# Patient Record
Sex: FEMALE | Race: WHITE | Employment: OTHER | URBAN - NONMETROPOLITAN AREA
[De-identification: names, ages, dates, MRNs, and addresses within clinical notes are randomized per-mention and may not be internally consistent; named-entity substitution may affect disease eponyms.]

---

## 2020-10-05 ENCOUNTER — APPOINTMENT (OUTPATIENT)
Dept: CT IMAGING | Age: 75
DRG: 871 | End: 2020-10-05
Payer: MEDICARE

## 2020-10-05 ENCOUNTER — HOSPITAL ENCOUNTER (INPATIENT)
Age: 75
LOS: 9 days | Discharge: NURSING, REHAB OR LONG-TERM CARE | DRG: 871 | End: 2020-10-14
Attending: EMERGENCY MEDICINE | Admitting: STUDENT IN AN ORGANIZED HEALTH CARE EDUCATION/TRAINING PROGRAM
Payer: MEDICARE

## 2020-10-05 ENCOUNTER — APPOINTMENT (OUTPATIENT)
Dept: GENERAL RADIOLOGY | Age: 75
DRG: 871 | End: 2020-10-05
Payer: MEDICARE

## 2020-10-05 PROBLEM — A41.9 SEPSIS WITH METABOLIC ENCEPHALOPATHY (HCC): Status: ACTIVE | Noted: 2020-10-05

## 2020-10-05 PROBLEM — E11.9 DIABETES MELLITUS (HCC): Status: ACTIVE | Noted: 2020-10-05

## 2020-10-05 PROBLEM — N39.0 UTI (URINARY TRACT INFECTION): Status: ACTIVE | Noted: 2020-10-05

## 2020-10-05 PROBLEM — G93.41 SEPSIS WITH METABOLIC ENCEPHALOPATHY (HCC): Status: ACTIVE | Noted: 2020-10-05

## 2020-10-05 PROBLEM — E83.52 HYPERCALCEMIA: Status: ACTIVE | Noted: 2020-10-05

## 2020-10-05 PROBLEM — R65.20 SEPSIS WITH METABOLIC ENCEPHALOPATHY (HCC): Status: ACTIVE | Noted: 2020-10-05

## 2020-10-05 PROBLEM — R41.82 AMS (ALTERED MENTAL STATUS): Status: ACTIVE | Noted: 2020-10-05

## 2020-10-05 LAB
ALBUMIN SERPL-MCNC: 4.4 G/DL (ref 3.5–5.2)
ALP BLD-CCNC: 59 U/L (ref 35–104)
ALT SERPL-CCNC: 23 U/L (ref 5–33)
AMMONIA: 12 UMOL/L (ref 11–51)
AMPHETAMINE SCREEN, URINE: NEGATIVE
ANION GAP SERPL CALCULATED.3IONS-SCNC: 11 MMOL/L (ref 7–19)
AST SERPL-CCNC: 22 U/L (ref 5–32)
BACTERIA: ABNORMAL /HPF
BARBITURATE SCREEN URINE: NEGATIVE
BASOPHILS ABSOLUTE: 0 K/UL (ref 0–0.2)
BASOPHILS RELATIVE PERCENT: 0.5 % (ref 0–1)
BENZODIAZEPINE SCREEN, URINE: NEGATIVE
BILIRUB SERPL-MCNC: 0.6 MG/DL (ref 0.2–1.2)
BILIRUBIN URINE: NEGATIVE
BLOOD, URINE: ABNORMAL
BUN BLDV-MCNC: 19 MG/DL (ref 8–23)
CALCIUM IONIZED: 1.49 MMOL/L (ref 1.12–1.32)
CALCIUM SERPL-MCNC: 12.1 MG/DL (ref 8.8–10.2)
CANNABINOID SCREEN URINE: NEGATIVE
CHLORIDE BLD-SCNC: 102 MMOL/L (ref 98–111)
CLARITY: ABNORMAL
CO2: 23 MMOL/L (ref 22–29)
COCAINE METABOLITE SCREEN URINE: NEGATIVE
COLOR: YELLOW
CREAT SERPL-MCNC: 0.9 MG/DL (ref 0.5–0.9)
CRYSTALS, UA: ABNORMAL /HPF
EOSINOPHILS ABSOLUTE: 0 K/UL (ref 0–0.6)
EOSINOPHILS RELATIVE PERCENT: 0.3 % (ref 0–5)
EPITHELIAL CELLS, UA: ABNORMAL /HPF
GFR AFRICAN AMERICAN: >59
GFR NON-AFRICAN AMERICAN: >60
GLUCOSE BLD-MCNC: 116 MG/DL (ref 70–99)
GLUCOSE BLD-MCNC: 146 MG/DL
GLUCOSE BLD-MCNC: 146 MG/DL (ref 70–99)
GLUCOSE BLD-MCNC: 169 MG/DL (ref 70–99)
GLUCOSE BLD-MCNC: 196 MG/DL (ref 70–99)
GLUCOSE BLD-MCNC: 211 MG/DL (ref 74–109)
GLUCOSE BLD-MCNC: 315 MG/DL (ref 70–99)
GLUCOSE URINE: =>1000 MG/DL
HCT VFR BLD CALC: 42.9 % (ref 37–47)
HEMOGLOBIN: 13.5 G/DL (ref 12–16)
IMMATURE GRANULOCYTES #: 0 K/UL
KETONES, URINE: 15 MG/DL
LEUKOCYTE ESTERASE, URINE: ABNORMAL
LYMPHOCYTES ABSOLUTE: 1.2 K/UL (ref 1.1–4.5)
LYMPHOCYTES RELATIVE PERCENT: 14.2 % (ref 20–40)
Lab: NORMAL
MAGNESIUM: 1.9 MG/DL (ref 1.6–2.4)
MCH RBC QN AUTO: 29.7 PG (ref 27–31)
MCHC RBC AUTO-ENTMCNC: 31.5 G/DL (ref 33–37)
MCV RBC AUTO: 94.5 FL (ref 81–99)
MONOCYTES ABSOLUTE: 0.5 K/UL (ref 0–0.9)
MONOCYTES RELATIVE PERCENT: 5.4 % (ref 0–10)
NEUTROPHILS ABSOLUTE: 6.8 K/UL (ref 1.5–7.5)
NEUTROPHILS RELATIVE PERCENT: 79.3 % (ref 50–65)
NITRITE, URINE: NEGATIVE
OPIATE SCREEN URINE: NEGATIVE
PARATHYROID HORMONE INTACT: 112.6 PG/ML (ref 15–65)
PDW BLD-RTO: 16.6 % (ref 11.5–14.5)
PERFORMED ON: ABNORMAL
PH UA: 5 (ref 5–8)
PHOSPHORUS: 2.6 MG/DL (ref 2.5–4.5)
PLATELET # BLD: 304 K/UL (ref 130–400)
PMV BLD AUTO: 10.1 FL (ref 9.4–12.3)
POTASSIUM REFLEX MAGNESIUM: 4.5 MMOL/L (ref 3.5–5)
PROTEIN UA: 30 MG/DL
RBC # BLD: 4.54 M/UL (ref 4.2–5.4)
RBC UA: ABNORMAL /HPF (ref 0–2)
SODIUM BLD-SCNC: 136 MMOL/L (ref 136–145)
SPECIFIC GRAVITY UA: 1.02 (ref 1–1.03)
TOTAL PROTEIN: 7.4 G/DL (ref 6.6–8.7)
TSH REFLEX FT4: 1.58 UIU/ML (ref 0.35–5.5)
UROBILINOGEN, URINE: 1 E.U./DL
VITAMIN D 25-HYDROXY: 37.7 NG/ML
WBC # BLD: 8.6 K/UL (ref 4.8–10.8)
WBC UA: ABNORMAL /HPF (ref 0–5)

## 2020-10-05 PROCEDURE — 6370000000 HC RX 637 (ALT 250 FOR IP): Performed by: STUDENT IN AN ORGANIZED HEALTH CARE EDUCATION/TRAINING PROGRAM

## 2020-10-05 PROCEDURE — 6370000000 HC RX 637 (ALT 250 FOR IP): Performed by: FAMILY MEDICINE

## 2020-10-05 PROCEDURE — 92610 EVALUATE SWALLOWING FUNCTION: CPT

## 2020-10-05 PROCEDURE — 87086 URINE CULTURE/COLONY COUNT: CPT

## 2020-10-05 PROCEDURE — 99999 PR OFFICE/OUTPT VISIT,PROCEDURE ONLY: CPT | Performed by: EMERGENCY MEDICINE

## 2020-10-05 PROCEDURE — 93005 ELECTROCARDIOGRAM TRACING: CPT | Performed by: STUDENT IN AN ORGANIZED HEALTH CARE EDUCATION/TRAINING PROGRAM

## 2020-10-05 PROCEDURE — 83970 ASSAY OF PARATHORMONE: CPT

## 2020-10-05 PROCEDURE — 99284 EMERGENCY DEPT VISIT MOD MDM: CPT

## 2020-10-05 PROCEDURE — 1210000000 HC MED SURG R&B

## 2020-10-05 PROCEDURE — 83735 ASSAY OF MAGNESIUM: CPT

## 2020-10-05 PROCEDURE — 2580000003 HC RX 258: Performed by: STUDENT IN AN ORGANIZED HEALTH CARE EDUCATION/TRAINING PROGRAM

## 2020-10-05 PROCEDURE — 84443 ASSAY THYROID STIM HORMONE: CPT

## 2020-10-05 PROCEDURE — 71046 X-RAY EXAM CHEST 2 VIEWS: CPT

## 2020-10-05 PROCEDURE — 82306 VITAMIN D 25 HYDROXY: CPT

## 2020-10-05 PROCEDURE — 36415 COLL VENOUS BLD VENIPUNCTURE: CPT

## 2020-10-05 PROCEDURE — 71250 CT THORAX DX C-: CPT

## 2020-10-05 PROCEDURE — 2580000003 HC RX 258: Performed by: EMERGENCY MEDICINE

## 2020-10-05 PROCEDURE — 82330 ASSAY OF CALCIUM: CPT

## 2020-10-05 PROCEDURE — 80053 COMPREHEN METABOLIC PANEL: CPT

## 2020-10-05 PROCEDURE — 92523 SPEECH SOUND LANG COMPREHEN: CPT

## 2020-10-05 PROCEDURE — 87077 CULTURE AEROBIC IDENTIFY: CPT

## 2020-10-05 PROCEDURE — 81001 URINALYSIS AUTO W/SCOPE: CPT

## 2020-10-05 PROCEDURE — 82140 ASSAY OF AMMONIA: CPT

## 2020-10-05 PROCEDURE — 6360000002 HC RX W HCPCS: Performed by: STUDENT IN AN ORGANIZED HEALTH CARE EDUCATION/TRAINING PROGRAM

## 2020-10-05 PROCEDURE — 70450 CT HEAD/BRAIN W/O DYE: CPT

## 2020-10-05 PROCEDURE — 82542 COL CHROMOTOGRAPHY QUAL/QUAN: CPT

## 2020-10-05 PROCEDURE — 74176 CT ABD & PELVIS W/O CONTRAST: CPT

## 2020-10-05 PROCEDURE — 6370000000 HC RX 637 (ALT 250 FOR IP): Performed by: EMERGENCY MEDICINE

## 2020-10-05 PROCEDURE — 87186 SC STD MICRODIL/AGAR DIL: CPT

## 2020-10-05 PROCEDURE — 93005 ELECTROCARDIOGRAM TRACING: CPT | Performed by: EMERGENCY MEDICINE

## 2020-10-05 PROCEDURE — 85025 COMPLETE CBC W/AUTO DIFF WBC: CPT

## 2020-10-05 PROCEDURE — 84100 ASSAY OF PHOSPHORUS: CPT

## 2020-10-05 PROCEDURE — 6360000002 HC RX W HCPCS: Performed by: EMERGENCY MEDICINE

## 2020-10-05 PROCEDURE — 80307 DRUG TEST PRSMV CHEM ANLYZR: CPT

## 2020-10-05 PROCEDURE — 82947 ASSAY GLUCOSE BLOOD QUANT: CPT

## 2020-10-05 PROCEDURE — 96374 THER/PROPH/DIAG INJ IV PUSH: CPT

## 2020-10-05 RX ORDER — LOSARTAN POTASSIUM 25 MG/1
25 TABLET ORAL DAILY
COMMUNITY

## 2020-10-05 RX ORDER — SODIUM CHLORIDE 0.9 % (FLUSH) 0.9 %
10 SYRINGE (ML) INJECTION PRN
Status: DISCONTINUED | OUTPATIENT
Start: 2020-10-05 | End: 2020-10-14 | Stop reason: HOSPADM

## 2020-10-05 RX ORDER — SODIUM CHLORIDE 0.9 % (FLUSH) 0.9 %
10 SYRINGE (ML) INJECTION EVERY 12 HOURS SCHEDULED
Status: DISCONTINUED | OUTPATIENT
Start: 2020-10-05 | End: 2020-10-14 | Stop reason: HOSPADM

## 2020-10-05 RX ORDER — CARVEDILOL 6.25 MG/1
6.25 TABLET ORAL 2 TIMES DAILY WITH MEALS
COMMUNITY

## 2020-10-05 RX ORDER — SODIUM CHLORIDE 9 MG/ML
INJECTION, SOLUTION INTRAVENOUS CONTINUOUS
Status: DISCONTINUED | OUTPATIENT
Start: 2020-10-05 | End: 2020-10-14 | Stop reason: HOSPADM

## 2020-10-05 RX ORDER — POTASSIUM CHLORIDE 7.45 MG/ML
10 INJECTION INTRAVENOUS PRN
Status: DISCONTINUED | OUTPATIENT
Start: 2020-10-05 | End: 2020-10-14 | Stop reason: HOSPADM

## 2020-10-05 RX ORDER — ACETAMINOPHEN 325 MG/1
650 TABLET ORAL EVERY 6 HOURS PRN
Status: DISCONTINUED | OUTPATIENT
Start: 2020-10-05 | End: 2020-10-14 | Stop reason: HOSPADM

## 2020-10-05 RX ORDER — CARVEDILOL 6.25 MG/1
6.25 TABLET ORAL 2 TIMES DAILY WITH MEALS
Status: DISCONTINUED | OUTPATIENT
Start: 2020-10-05 | End: 2020-10-07

## 2020-10-05 RX ORDER — GLIMEPIRIDE 4 MG/1
4 TABLET ORAL 2 TIMES DAILY
COMMUNITY

## 2020-10-05 RX ORDER — LOSARTAN POTASSIUM 25 MG/1
25 TABLET ORAL DAILY
Status: DISCONTINUED | OUTPATIENT
Start: 2020-10-05 | End: 2020-10-14 | Stop reason: HOSPADM

## 2020-10-05 RX ORDER — DAPAGLIFLOZIN AND METFORMIN HYDROCHLORIDE 5; 1000 MG/1; MG/1
TABLET, FILM COATED, EXTENDED RELEASE ORAL 2 TIMES DAILY
COMMUNITY

## 2020-10-05 RX ORDER — ZOLEDRONIC ACID 0.04 MG/ML
4 INJECTION, SOLUTION INTRAVENOUS ONCE
Status: DISCONTINUED | OUTPATIENT
Start: 2020-10-05 | End: 2020-10-05 | Stop reason: SDUPTHER

## 2020-10-05 RX ORDER — POTASSIUM CHLORIDE 20 MEQ/1
40 TABLET, EXTENDED RELEASE ORAL PRN
Status: DISCONTINUED | OUTPATIENT
Start: 2020-10-05 | End: 2020-10-14 | Stop reason: HOSPADM

## 2020-10-05 RX ORDER — ROSUVASTATIN CALCIUM 20 MG/1
20 TABLET, COATED ORAL DAILY
COMMUNITY

## 2020-10-05 RX ORDER — PROMETHAZINE HYDROCHLORIDE 25 MG/1
12.5 TABLET ORAL EVERY 6 HOURS PRN
Status: DISCONTINUED | OUTPATIENT
Start: 2020-10-05 | End: 2020-10-14 | Stop reason: HOSPADM

## 2020-10-05 RX ORDER — ONDANSETRON 2 MG/ML
4 INJECTION INTRAMUSCULAR; INTRAVENOUS EVERY 6 HOURS PRN
Status: DISCONTINUED | OUTPATIENT
Start: 2020-10-05 | End: 2020-10-14 | Stop reason: HOSPADM

## 2020-10-05 RX ORDER — DEXTROSE MONOHYDRATE 25 G/50ML
12.5 INJECTION, SOLUTION INTRAVENOUS PRN
Status: DISCONTINUED | OUTPATIENT
Start: 2020-10-05 | End: 2020-10-14 | Stop reason: HOSPADM

## 2020-10-05 RX ORDER — ACETAMINOPHEN 650 MG/1
650 SUPPOSITORY RECTAL EVERY 6 HOURS PRN
Status: DISCONTINUED | OUTPATIENT
Start: 2020-10-05 | End: 2020-10-14 | Stop reason: HOSPADM

## 2020-10-05 RX ORDER — ACETAMINOPHEN 325 MG/1
650 TABLET ORAL ONCE
Status: COMPLETED | OUTPATIENT
Start: 2020-10-05 | End: 2020-10-05

## 2020-10-05 RX ORDER — POLYETHYLENE GLYCOL 3350 17 G/17G
17 POWDER, FOR SOLUTION ORAL DAILY PRN
Status: DISCONTINUED | OUTPATIENT
Start: 2020-10-05 | End: 2020-10-14 | Stop reason: HOSPADM

## 2020-10-05 RX ORDER — DEXTROSE MONOHYDRATE 50 MG/ML
100 INJECTION, SOLUTION INTRAVENOUS PRN
Status: DISCONTINUED | OUTPATIENT
Start: 2020-10-05 | End: 2020-10-14 | Stop reason: HOSPADM

## 2020-10-05 RX ORDER — NICOTINE POLACRILEX 4 MG
15 LOZENGE BUCCAL PRN
Status: DISCONTINUED | OUTPATIENT
Start: 2020-10-05 | End: 2020-10-14 | Stop reason: HOSPADM

## 2020-10-05 RX ORDER — CHOLECALCIFEROL (VITAMIN D3) 125 MCG
5 CAPSULE ORAL NIGHTLY PRN
Status: DISCONTINUED | OUTPATIENT
Start: 2020-10-05 | End: 2020-10-12

## 2020-10-05 RX ORDER — INSULIN GLARGINE 100 [IU]/ML
10 INJECTION, SOLUTION SUBCUTANEOUS NIGHTLY
Status: DISCONTINUED | OUTPATIENT
Start: 2020-10-05 | End: 2020-10-14 | Stop reason: HOSPADM

## 2020-10-05 RX ORDER — MAGNESIUM SULFATE IN WATER 40 MG/ML
2 INJECTION, SOLUTION INTRAVENOUS PRN
Status: DISCONTINUED | OUTPATIENT
Start: 2020-10-05 | End: 2020-10-14 | Stop reason: HOSPADM

## 2020-10-05 RX ORDER — ROSUVASTATIN CALCIUM 20 MG/1
20 TABLET, COATED ORAL DAILY
Status: DISCONTINUED | OUTPATIENT
Start: 2020-10-05 | End: 2020-10-14 | Stop reason: HOSPADM

## 2020-10-05 RX ORDER — 0.9 % SODIUM CHLORIDE 0.9 %
1000 INTRAVENOUS SOLUTION INTRAVENOUS ONCE
Status: COMPLETED | OUTPATIENT
Start: 2020-10-05 | End: 2020-10-05

## 2020-10-05 RX ADMIN — Medication 10 UNITS: at 20:38

## 2020-10-05 RX ADMIN — ACETAMINOPHEN 650 MG: 325 TABLET ORAL at 03:33

## 2020-10-05 RX ADMIN — INSULIN LISPRO 1 UNITS: 100 INJECTION, SOLUTION INTRAVENOUS; SUBCUTANEOUS at 09:57

## 2020-10-05 RX ADMIN — CARVEDILOL 6.25 MG: 6.25 TABLET, FILM COATED ORAL at 17:20

## 2020-10-05 RX ADMIN — Medication 5 MG: at 22:31

## 2020-10-05 RX ADMIN — SODIUM CHLORIDE, PRESERVATIVE FREE 10 ML: 5 INJECTION INTRAVENOUS at 10:00

## 2020-10-05 RX ADMIN — ROSUVASTATIN CALCIUM 20 MG: 20 TABLET, FILM COATED ORAL at 17:20

## 2020-10-05 RX ADMIN — ZOLEDRONIC ACID 4 MG: 4 INJECTION, SOLUTION, CONCENTRATE INTRAVENOUS at 05:28

## 2020-10-05 RX ADMIN — ENOXAPARIN SODIUM 40 MG: 40 INJECTION SUBCUTANEOUS at 09:59

## 2020-10-05 RX ADMIN — CEFTRIAXONE 1 G: 1 INJECTION, POWDER, FOR SOLUTION INTRAMUSCULAR; INTRAVENOUS at 20:46

## 2020-10-05 RX ADMIN — ACETAMINOPHEN 650 MG: 325 TABLET ORAL at 22:04

## 2020-10-05 RX ADMIN — CEFTRIAXONE 1 G: 1 INJECTION, POWDER, FOR SOLUTION INTRAMUSCULAR; INTRAVENOUS at 03:35

## 2020-10-05 RX ADMIN — SODIUM CHLORIDE: 9 INJECTION, SOLUTION INTRAVENOUS at 20:37

## 2020-10-05 RX ADMIN — SODIUM CHLORIDE: 9 INJECTION, SOLUTION INTRAVENOUS at 05:28

## 2020-10-05 RX ADMIN — LOSARTAN POTASSIUM 25 MG: 25 TABLET, FILM COATED ORAL at 17:20

## 2020-10-05 RX ADMIN — SODIUM CHLORIDE 1000 ML: 9 INJECTION, SOLUTION INTRAVENOUS at 03:38

## 2020-10-05 RX ADMIN — INSULIN LISPRO 4 UNITS: 100 INJECTION, SOLUTION INTRAVENOUS; SUBCUTANEOUS at 13:14

## 2020-10-05 ASSESSMENT — PAIN DESCRIPTION - ONSET: ONSET: SUDDEN

## 2020-10-05 ASSESSMENT — PAIN SCALES - GENERAL
PAINLEVEL_OUTOF10: 0
PAINLEVEL_OUTOF10: 4
PAINLEVEL_OUTOF10: 0
PAINLEVEL_OUTOF10: 5

## 2020-10-05 ASSESSMENT — ENCOUNTER SYMPTOMS
VOICE CHANGE: 0
EYE PAIN: 0
COUGH: 0
VOMITING: 0
EYE REDNESS: 0
RHINORRHEA: 0
ABDOMINAL PAIN: 0
DIARRHEA: 0
SHORTNESS OF BREATH: 0

## 2020-10-05 ASSESSMENT — PAIN DESCRIPTION - DESCRIPTORS: DESCRIPTORS: HEADACHE

## 2020-10-05 ASSESSMENT — PAIN DESCRIPTION - LOCATION: LOCATION: HEAD

## 2020-10-05 ASSESSMENT — PAIN DESCRIPTION - FREQUENCY: FREQUENCY: INTERMITTENT

## 2020-10-05 ASSESSMENT — PAIN DESCRIPTION - PAIN TYPE: TYPE: ACUTE PAIN

## 2020-10-05 ASSESSMENT — PAIN DESCRIPTION - PROGRESSION: CLINICAL_PROGRESSION: GRADUALLY IMPROVING

## 2020-10-05 ASSESSMENT — PAIN - FUNCTIONAL ASSESSMENT: PAIN_FUNCTIONAL_ASSESSMENT: ACTIVITIES ARE NOT PREVENTED

## 2020-10-05 NOTE — SIGNIFICANT EVENT
Examined on the floor. Patient is pleasant and remembers where she is at and the year, but states that her son brought her to the hospital because she \"had a spell\" while visiting him. She does not know her home medications, nursing to call pharmacy to enter them and then I will reconcile them.

## 2020-10-05 NOTE — ED NOTES
Spoke with patient son and explained patient is being admitted for a UTI. Advised patient son she would be staying in the hospital.  I also advised him it would be necessary to continue to travel to this area to help care management with his mom's case.       Patient gets RXs filled at [facility] in her hometown       [facility]  10/05/20 [address], RN  10/05/20 5686

## 2020-10-05 NOTE — CARE COORDINATION
Met with patient to assess discharge needs. Current Readmission Risk score is 10%. Patient's son, Peyton Peguero, present. Patient lives in [place], [place] with a roommate. Bud lives in [address] Roommate is alert and oriented. Per Peyton Peguero, he is a  and stays in patient's home and assists with household chores. Patient's son plans on not returning patient's car to her, so she will be unable to drive. Patient states her neighbor next door is a nurse and helps her, when needed. She has a cane and walker. She is currently not receiving Home Health. She is agreeable to Home Health services at discharge. Her son gave her the choice of discharging to a SNF or HH. He believes HH will be adequate at this time. He also plans on speaking with her roommate. [facility]. She reports that she is able to afford her medications at discharge. At discharge, Peyton Peguero will provide transportation. Provided Peyton Peguero with CME number. He plans to call to learn where her car is located.   Electronically signed by Emil Givens RN on 10/5/2020 at 11:46 AM

## 2020-10-05 NOTE — PROGRESS NOTES
Speech Language Pathology  Facility/Department: [facility] 4 ONCOLOGY UNIT  Initial Speech/Language/Cognitive Assessment  Bedside Swallow Evaluation     NAME: Odilon Lopez  : 1945   MRN: [id number]  ADMISSION DATE: 10/5/2020  ADMITTING DIAGNOSIS: has Sepsis with metabolic encephalopathy ([facility] [address] UTI (urinary tract infection); Diabetes mellitus ([facility] [address] and Hypercalcemia on their problem list.    Date of Eval: 10/5/2020   Evaluating Therapist: ALEX Santana    Assessment:  Completed assessment. SLP ranked functional intelligibility of speech for unfamiliar listeners at 100% in utterances without background noise present. Patient exhibits slow processing, decreased select and sustained attention, delayed auditory comprehension of even simple questions and commands, delayed verbalizations with mild fragmentations observed where the patient started expressions but then discontinued, decreased orientation, impaired short-term/working memory, and delayed reasoning/problem solving. It is noted that during evaluation, patient did not demonstrate ability to verbalize PCP at independent level. Patient did verbalize pharmacy independently. Patient did verbalize conditions in which she takes medications but exhibited difficulty verbalizing names of medications she currently takes at independent level. Also assessed patient's swallowing function. Patient exhibits slow oral prep of more solid consistencies as well as sluggish laryngeal elevation for swallow airway protection. Even  so, no outward S/S penetration/aspiration was observed with any regular solid consistency trial or thin H2O trial administered during evaluation this date.     At this time, recommend regular solid consistency and thin liquids. Assist in meal set-up. Will continue to follow.  Thank you for this consult.     Goals:  Short-term Goals  Timeframe for Short-term Goals: 1-2x/day for 3 days  Goal 1: Patient will tolerate regular solid consistency and thin liquids with min S/S penetration/aspiration during PO intake. Goal 2: Patient staff will follow swallow safety recommendations to decrease risk of penetration/aspiration during PO intake. Goal 3: Monitor speech/language/cognitive functioning.      Subjective:  General  Chart Reviewed: Yes  Patient assessed for rehabilitation services?: Yes  Family / Caregiver Present: Yes      Objective: Auditory Comprehension  Comprehension:  (While delayed, patient demonstrated ability to answer simple yes/no questions regarding immediate environment and current state of being at independent level. While delayed, patient demonstrated ability to follow simple 1 step commands independently. While delayed, patient demonstrated ability to answer yes/no questions of increased complexity and to follow complex 1 and simple 2 step commands at independent level.)     Expression  Primary Mode of Expression:  (Confrontation naming of items in room was considered to be delayed. Structured responsive speech and responses in natural conversation were considered to be frequently delayed and mildly fragmented where the patient started expressions but then discontinued.)     Motor Speech:  (SLP ranked functional intelligibility of speech for unfamiliar listeners at 100% in verbalizations.)     Overall Orientation Status:  (Patient demonstrated ability to verbalize name, birthday, age, address, phone number, and year at independent level. Patient did not verbalize city, state, hospital, month, ESTER, or date independently.)     Attention:  (Patient demonstrated decreased select and sustained attention during assessment. No adverse behaviors were noted with provision of redirections.)     Memory:  (Patient demonstrated appropriate immediate memory with sequences of unrelated numbers/words set up to 5 items without repetitions provided.  Patient demonstrated decreased short-term/working memory with 10 minute delay+distractions present during assessment.)    Reasoning/Problem Solving: (While delayed, patient demonstrated ability to verbalize appropriate simple solutions to situations that could occur during activities of daily living at independent level. It is noted that during evaluation, patient did not demonstrate ability to verbalize PCP at independent level. Patient did verbalize pharmacy independently. Patient did verbalize conditions in which she takes medications but exhibited difficulty verbalizing names of medications she currently takes at independent level.)    Additional Assessments:  Assessed patient's swallowing function. With regular solid consistency trials presented independently, patient exhibited slow oral prep. Oral transit of regular solid consistency primarily measured 1-2 seconds in length and min oral cavity residue was noted post swallows; residue cleared from the mouth with additional dry swallows. Oral transit of thin H2O trials, presented independently via cup, primarily measured 1-2 seconds in length. Laryngeal elevation during swallow initiation was considered to be sluggish for swallow airway protection. Even  so, no outward S/S penetration/aspiration was observed with any regular solid consistency trial or thin H2O trial administered during evaluation this date.     At this time, recommend regular solid consistency and thin liquids. Assist in meal set-up. Will continue to follow.      Electronically signed by ALEX Marquez on 10/5/2020 at 11:53 AM

## 2020-10-05 NOTE — PLAN OF CARE
Problem: Nutrition  Goal: Optimal nutrition therapy  Outcome: Ongoing   Nutrition Problem #1: Inadequate oral intake  Intervention: Food and/or Nutrient Delivery: Continue Current Diet, Start Oral Nutrition Supplement  Nutritional Goals: PO intake >50%, wt stable

## 2020-10-05 NOTE — ED PROVIDER NOTES
[facility] [address]      Pt Name: Agnes Natarajan  MRN: [id number]  [place] 1945  Date of evaluation: 10/5/2020  Provider: Wili Lara MD    CHIEF COMPLAINT       Chief Complaint   Patient presents with    Altered Mental Status         HISTORY OF PRESENT ILLNESS   (Location/Symptom, Timing/Onset,Context/Setting, Quality, Duration, Modifying Factors, Severity)  Note limiting factors. Agnes Natarajan is a 66 y.o. female who presents to the emergency department for evaluation after she was found by police. Patient was reported as a missing person yesterday and has been gone for approximately 24 hours. Police were using a GPS tracker on her phone to locate her and caught up with her at a gas station. Patient was asking for directions to several different places including [facility], [place], and [place] according to individuals interviewed at the gas station where she was found. Patient is from [facility] and states she believes she was trying to get to [place]. States she does not remember what happened or how she got here. Patient states she takes several medications daily but does not know what they are. States she has felt fine recently and denies any recent illness or other symptoms. Does note that she has a headache which is not unusual for her. Patient's son denies any history of dementia but does state that she has been having repetitive statements recently along with some signs of memory impairment. HPI    NursingNotes were reviewed. REVIEW OF SYSTEMS    (2-9 systems for level 4, 10 or more for level 5)     Review of Systems   Constitutional: Negative for fatigue and fever. HENT: Negative for congestion, rhinorrhea and voice change. Eyes: Negative for pain and redness. Respiratory: Negative for cough and shortness of breath. Cardiovascular: Negative for chest pain.    Gastrointestinal: Negative for abdominal pain, diarrhea and vomiting. Endocrine: Negative. Genitourinary: Negative. Musculoskeletal: Negative for arthralgias and gait problem. Skin: Negative for rash and wound. Neurological: Positive for headaches. Negative for weakness. Hematological: Negative. All other systems reviewed and are negative. A complete review of systems was performed and is negative except as noted above in the HPI. PAST MEDICAL HISTORY     Past Medical History:   Diagnosis Date    Diabetes ([facility] [address]     Hypertension          SURGICAL HISTORY       Past Surgical History:   Procedure Laterality Date    APPENDECTOMY      CORONARY ARTERY BYPASS GRAFT      HYSTERECTOMY           CURRENT MEDICATIONS       There are no discharge medications for this patient. ALLERGIES     Pcn [penicillins]    FAMILY HISTORY     History reviewed. No pertinent family history.        SOCIAL HISTORY       Social History     Socioeconomic History    Marital status: Unknown     Spouse name: None    Number of children: None    Years of education: None    Highest education level: None   Occupational History    None   Social Needs    Financial resource strain: None    Food insecurity     Worry: None     Inability: None    Transportation needs     Medical: None     Non-medical: None   Tobacco Use    Smoking status: Never Smoker    Smokeless tobacco: Never Used   Substance and Sexual Activity    Alcohol use: Yes     Comment: Occasional    Drug use: None    Sexual activity: None   Lifestyle    Physical activity     Days per week: None     Minutes per session: None    Stress: None   Relationships    Social connections     Talks on phone: None     Gets together: None     Attends Islam service: None     Active member of club or organization: None     Attends meetings of clubs or organizations: None     Relationship status: None    Intimate partner violence     Fear of current or ex partner: None     Emotionally abused: None     Physically abused: None     Forced sexual activity: None   Other Topics Concern    None   Social History Narrative    None       SCREENINGS    Jeffy Coma Scale  Eye Opening: Spontaneous  Best Verbal Response: Oriented  Best Motor Response: Obeys commands  [place] Coma Scale Score: 15        PHYSICAL EXAM    (up to 7 for level 4, 8 or more for level 5)     ED Triage Vitals [10/05/20 0141]   BP Temp Temp Source Pulse Resp SpO2 Height Weight   (!) 135/95 98.4 °F (36.9 °C) Oral 121 18 99 % 5' 6\" (1.676 m) 210 lb (95.3 kg)       Physical Exam  Vitals signs and nursing note reviewed. Constitutional:       General: She is not in acute distress. Appearance: She is well-developed. She is not toxic-appearing or diaphoretic. HENT:      Head: Normocephalic and atraumatic. Eyes:      General: No scleral icterus. Right eye: No discharge. Left eye: No discharge. Pupils: Pupils are equal, round, and reactive to light. Neck:      Musculoskeletal: Normal range of motion. Cardiovascular:      Rate and Rhythm: Normal rate and regular rhythm. Pulses: Normal pulses. Heart sounds: Normal heart sounds. Pulmonary:      Effort: Pulmonary effort is normal. No respiratory distress. Breath sounds: Normal breath sounds. No stridor. No wheezing or rhonchi. Abdominal:      General: There is no distension. Musculoskeletal: Normal range of motion. General: No deformity. Right lower leg: No edema. Left lower leg: No edema. Skin:     General: Skin is warm and dry. Neurological:      General: No focal deficit present. Mental Status: She is alert and oriented to person, place, and time. GCS: GCS eye subscore is 4. GCS verbal subscore is 5. GCS motor subscore is 6. Cranial Nerves: Cranial nerves are intact. No cranial nerve deficit. Sensory: Sensation is intact. Motor: Motor function is intact. No weakness or abnormal muscle tone.       Coordination: 4+ (*)     Crystals, UA NEG (*)     All other components within normal limits   POCT GLUCOSE - Abnormal; Notable for the following components:    POC Glucose 146 (*)     All other components within normal limits   POCT GLUCOSE - Normal   CULTURE, URINE   VITAMIN D 25 HYDROXY   TSH WITH REFLEX TO FT4   URINE DRUG SCREEN   MAGNESIUM   PHOSPHORUS   PTH, INTACT   PTH-RELATED PEPTIDE   CALCIUM, IONIZED   AMMONIA   POCT GLUCOSE   POCT GLUCOSE       All other labs were within normal range or not returned as of this dictation.     Medications   glucose (GLUTOSE) 40 % oral gel 15 g (has no administration in time range)   dextrose 50 % IV solution (has no administration in time range)   glucagon (rDNA) injection 1 mg (has no administration in time range)   dextrose 5 % solution (has no administration in time range)   insulin lispro (HUMALOG) injection vial 0-6 Units (has no administration in time range)   insulin lispro (HUMALOG) injection vial 0-3 Units (has no administration in time range)   cefTRIAXone (ROCEPHIN) 1 g in sterile water 10 mL IV syringe (has no administration in time range)   sodium chloride flush 0.9 % injection 10 mL (has no administration in time range)   sodium chloride flush 0.9 % injection 10 mL (has no administration in time range)   acetaminophen (TYLENOL) tablet 650 mg (has no administration in time range)     Or   acetaminophen (TYLENOL) suppository 650 mg (has no administration in time range)   polyethylene glycol (GLYCOLAX) packet 17 g (has no administration in time range)   promethazine (PHENERGAN) tablet 12.5 mg (has no administration in time range)     Or   ondansetron (ZOFRAN) injection 4 mg (has no administration in time range)   enoxaparin (LOVENOX) injection 40 mg (has no administration in time range)   0.9 % sodium chloride infusion ( Intravenous New Bag 10/5/20 0528)   potassium chloride (KLOR-CON M) extended release tablet 40 mEq (has no administration in time range)     Or   potassium CONSULT TO DIETITIAN  IP CONSULT TO SOCIAL WORK    PROCEDURES:  Unless otherwise notedbelow, none     Procedures      FINAL IMPRESSION     1. Delirium    2. Acute cystitis without hematuria    3. Type 2 diabetes mellitus with hyperglycemia, without long-term current use of insulin [facility]    4. Hypercalcemia          DISPOSITION/PLAN   DISPOSITION        PATIENT REFERRED TO:  No follow-up provider specified. DISCHARGE MEDICATIONS:  There are no discharge medications for this patient.          (Please note that portions of this note were completed with a voice recognition program.  Efforts were made to edit the dictations butoccasionally words are mis-transcribed.)    Eneida Moser MD (electronically signed)  AttendingEmergency Physician         Eneida Moser., MD  10/05/20 9254

## 2020-10-05 NOTE — PROGRESS NOTES
Pt alert to person but now asking where she is. Pt talks in circles somewhat and changes the stories that she had previously spoke with me about. Pt was reoriented to place time and situation. Will monitor closely.

## 2020-10-05 NOTE — PROGRESS NOTES
4 Eyes Skin Assessment    Ciro Pugh is being assessed upon: Admission    I agree that I, Yoni Nova, along with Thegerman Arnett (either 2 RN's or 1 LPN and 1 RN) have performed a thorough Head to Toe Skin Assessment on the patient. ALL assessment sites listed below have been assessed.       Areas assessed by both nurses:     [x]   Head, Face, and Ears   [x]   Shoulders, Back, and Chest  [x]   Arms, Elbows, and Hands   [x]   Coccyx, Sacrum, and Ischium  [x]   Legs, Feet, and Heels    Does the Patient have Skin Breakdown? n      Dimitri Prevention initiated: No  Wound Care Orders initiated: No    WOC nurse consulted for Pressure Injury (Stage 3,4, Unstageable, DTI, NWPT, and Complex wounds) and New or Established Ostomies: No        Primary Nurse eSignature: Yoni Nova RN on 10/5/2020 at 6:38 AM      Co-Signer eSignature: Electronically signed by Tez Andesron LPN on 29/6/21 at 9:51 AM CDT

## 2020-10-05 NOTE — ED TRIAGE NOTES
Patient arrive by [facility] from [employer] with c/o of confusion. Per EMS and [place] [employer] patient was found at [facility] asking for directions to [place] or [place]. Patient left [place] this morning and states she was going to [place] to see her son. Son's name is Erwin Pugh, PD is on the phone with son at this time, who lives in [address] Patient had [employer] Alert issued for her disappearance. She left her home in [place] around 8 this morning.

## 2020-10-05 NOTE — H&P
Hospitalist: History and Physical    Date: 10/5/2020 Time: 4:12 AM    Name: Gilbert Monsivais : 1945 MRN: [id number]    Code Status: Full Code No additional code details    PCP: No primary care provider on file. Patient's Chief Complaint Is:    AMS    HPI: Patient is a 66 y.o. female with a past medical history of diabetes mellitus. Patient presented to [facility] ED after being found by police (missing person reported yesterday). After she was found, patient was noted to be confused, asking for directions to several different places in different states (patient is from [facility]). Patient does not recall what happened or how she got there. No known history of dementia, however patient son reports that patient has recently had some memory loss with poor recollection, confusion and repetitive statements. Patient reportedly has not been living with family, but with an elderly roommate. Initial work-up in the ED, significant for UTI with UA showing moderate leukocyte esterase, 4+ bacteria, numerous WBCs, cloudy urine, also with large amount of blood in urine. Patient unsure about urinary symptoms when questioned, but then endorsed some suprapubic discomfort with pressure and also wears pads due to urinary incontinence. Initial work-up also significant for hyperglycemia with blood glucose 211 and significant hypercalcemia with calcium 12.1. Patient given dose of Rocephin and 1 L NS bolus in the ED. She is pleasantly confused but alert during my evaluation, and clearly has poor recollection of events. While infection and metabolic disturbance may be contributing to some AMS, I have high suspicion for underlying dementia. Past Medical History:   Diagnosis Date    Diabetes ([address]     Hypertension      Past Surgical History:   Procedure Laterality Date    APPENDECTOMY      CORONARY ARTERY BYPASS GRAFT      HYSTERECTOMY       History reviewed.  No pertinent family history. Social History     Socioeconomic History    Marital status: Unknown     Spouse name: Not on file    Number of children: Not on file    Years of education: Not on file    Highest education level: Not on file   Occupational History    Not on file   Social Needs    Financial resource strain: Not on file    Food insecurity     Worry: Not on file     Inability: Not on file    Transportation needs     Medical: Not on file     Non-medical: Not on file   Tobacco Use    Smoking status: Never Smoker    Smokeless tobacco: Never Used   Substance and Sexual Activity    Alcohol use: Yes     Comment: Occasional    Drug use: Not on file    Sexual activity: Not on file   Lifestyle    Physical activity     Days per week: Not on file     Minutes per session: Not on file    Stress: Not on file   Relationships    Social connections     Talks on phone: Not on file     Gets together: Not on file     Attends Nondenominational service: Not on file     Active member of club or organization: Not on file     Attends meetings of clubs or organizations: Not on file     Relationship status: Not on file    Intimate partner violence     Fear of current or ex partner: Not on file     Emotionally abused: Not on file     Physically abused: Not on file     Forced sexual activity: Not on file   Other Topics Concern    Not on file   Social History Narrative    Not on file     Allergies   Allergen Reactions    Pcn [Penicillins]      Prior to Admission medications    Not on File       I have reviewed all pertinent history. Prior medical records and laboratory evaluation reviewed. Imaging independently reviewed. Review of Systems:   As per HPI, otherwise all other ROS performed and found to be negative at this time.     Physical Exam:  Vitals:    10/05/20 0401   BP: (!) 145/75   Pulse: 111   Resp: 15   Temp: 98 °F (36.7 °C)   SpO2: 98%     CONSTITUTIONAL: Appears confused, oriented to person, disoriented to place and time  PSYCH: Judgement and insight are impaired  HEENT: Normocephalic/atraumatic, sclera anicteric, dry mucous membranes  NECK: Trachea is midline. LYMPHATIC: No anterior cervical adenopathy  LUNGS: Diminished breath sounds bilaterally  CARDIOVASCULAR: Regular rate and rhythm, peripheral pulses symmetric  GI/ABDOMEN: Soft, non-tender, non-distended, no guarding or rebound. Bowel sounds are normal.  SKIN: Warm and dry.    NEUROLOGICAL: Confused but full strength in all extremities, no dysarthria or aphasia  EXTREMITIES: No edema    Labs:   Recent Results (from the past 72 hour(s))   POCT Glucose    Collection Time: 10/05/20  1:40 AM   Result Value Ref Range    POC Glucose 146 (H) 70 - 99 mg/dl    Performed on AccuChek    POCT Glucose    Collection Time: 10/05/20  1:55 AM   Result Value Ref Range    Glucose 146 mg/dL   Urinalysis Reflex to Culture    Collection Time: 10/05/20  2:45 AM    Specimen: Urine, clean catch   Result Value Ref Range    Color, UA YELLOW Straw/Yellow    Clarity, UA CLOUDY (A) Clear    Glucose, Ur =>1000 Negative mg/dL    Bilirubin Urine Negative Negative    Ketones, Urine 15 (A) Negative mg/dL    Specific Gravity, UA 1.024 1.005 - 1.030    Blood, Urine LARGE (A) Negative    pH, UA 5.0 5.0 - 8.0    Protein, UA 30 (A) Negative mg/dL    Urobilinogen, Urine 1.0 <2.0 E.U./dL    Nitrite, Urine Negative Negative    Leukocyte Esterase, Urine MODERATE (A) Negative   Microscopic Urinalysis    Collection Time: 10/05/20  2:45 AM   Result Value Ref Range    WBC, UA 21-30 (A) 0 - 5 /HPF    RBC, UA 2-4 0 - 2 /HPF    Epithelial Cells, UA 0-2 /HPF    Bacteria, UA 4+ (A) None Seen /HPF    Crystals, UA NEG (A) None Seen /HPF   CBC Auto Differential    Collection Time: 10/05/20  2:50 AM   Result Value Ref Range    WBC 8.6 4.8 - 10.8 K/uL    RBC 4.54 4.20 - 5.40 M/uL    Hemoglobin 13.5 12.0 - 16.0 g/dL    Hematocrit 42.9 37.0 - 47.0 %    MCV 94.5 81.0 - 99.0 fL    MCH 29.7 27.0 - 31.0 pg    MCHC 31.5 (L) 33.0 - 37.0 g/dL    RDW 16.6 (H) 11.5 - 14.5 %    Platelets 328 377 - 769 K/uL    MPV 10.1 9.4 - 12.3 fL    Neutrophils % 79.3 (H) 50.0 - 65.0 %    Lymphocytes % 14.2 (L) 20.0 - 40.0 %    Monocytes % 5.4 0.0 - 10.0 %    Eosinophils % 0.3 0.0 - 5.0 %    Basophils % 0.5 0.0 - 1.0 %    Neutrophils Absolute 6.8 1.5 - 7.5 K/uL    Immature Granulocytes # 0.0 K/uL    Lymphocytes Absolute 1.2 1.1 - 4.5 K/uL    Monocytes Absolute 0.50 0.00 - 0.90 K/uL    Eosinophils Absolute 0.00 0.00 - 0.60 K/uL    Basophils Absolute 0.00 0.00 - 0.20 K/uL   Comprehensive Metabolic Panel w/ Reflex to MG    Collection Time: 10/05/20  2:50 AM   Result Value Ref Range    Sodium 136 136 - 145 mmol/L    Potassium reflex Magnesium 4.5 3.5 - 5.0 mmol/L    Chloride 102 98 - 111 mmol/L    CO2 23 22 - 29 mmol/L    Anion Gap 11 7 - 19 mmol/L    Glucose 211 (H) 74 - 109 mg/dL    BUN 19 8 - 23 mg/dL    CREATININE 0.9 0.5 - 0.9 mg/dL    GFR Non-African American >60 >60    GFR African American >59 >59    Calcium 12.1 (HH) 8.8 - 10.2 mg/dL    Total Protein 7.4 6.6 - 8.7 g/dL    Alb 4.4 3.5 - 5.2 g/dL    Total Bilirubin 0.6 0.2 - 1.2 mg/dL    Alkaline Phosphatase 59 35 - 104 U/L    ALT 23 5 - 33 U/L    AST 22 5 - 32 U/L       Imaging: No results found. Assessment/Plan:     Principal Problem:    AMS (altered mental status)  Active Problems:    UTI (urinary tract infection)    Hypercalcemia    Diabetes mellitus ([facility] [address]  Resolved Problems:    * No resolved hospital problems.  *       Altered mental status, in setting of UTI and hypercalcemia, as well as likely underlying dementia  - Address potential contributing factors including UTI and hypercalcemia  -Check UDS, thyroid function and ammonia levels  -Supportive care    UTI  - Rocephin, follow-up urine culture    Hypercalcemia (given degree of hypercalcemia and presentation, highly concerning for underlying occult malignancy)  -Treat with IV hydration and Zoledronic Acid  - Repeat level, check ionized calcium, check PTH, PTH-rP, vitamin D, phosphorus  - CT chest/abdomen/pelvis to evaluate for occult malignancy or other source    Diabetes mellitus, with hyperglycemia  -POC glucose checks every 6 hours  -Sliding scale insulin as indicated    PT/OT/speech evaluation  Social work consult    DVT prophylaxis-Lovenox    Xiomy Fleming  10/5/2020 4:12 AM

## 2020-10-06 LAB
ANION GAP SERPL CALCULATED.3IONS-SCNC: 9 MMOL/L (ref 7–19)
BASOPHILS ABSOLUTE: 0.1 K/UL (ref 0–0.2)
BASOPHILS RELATIVE PERCENT: 0.8 % (ref 0–1)
BUN BLDV-MCNC: 21 MG/DL (ref 8–23)
CALCIUM SERPL-MCNC: 10.4 MG/DL (ref 8.8–10.2)
CHLORIDE BLD-SCNC: 108 MMOL/L (ref 98–111)
CO2: 19 MMOL/L (ref 22–29)
CREAT SERPL-MCNC: 0.8 MG/DL (ref 0.5–0.9)
EKG P AXIS: 59 DEGREES
EKG P-R INTERVAL: 112 MS
EKG Q-T INTERVAL: 324 MS
EKG QRS DURATION: 96 MS
EKG QTC CALCULATION (BAZETT): 388 MS
EKG T AXIS: 26 DEGREES
EOSINOPHILS ABSOLUTE: 0.3 K/UL (ref 0–0.6)
EOSINOPHILS RELATIVE PERCENT: 3.5 % (ref 0–5)
GFR AFRICAN AMERICAN: >59
GFR NON-AFRICAN AMERICAN: >60
GLUCOSE BLD-MCNC: 113 MG/DL (ref 70–99)
GLUCOSE BLD-MCNC: 114 MG/DL (ref 74–109)
GLUCOSE BLD-MCNC: 174 MG/DL (ref 70–99)
GLUCOSE BLD-MCNC: 175 MG/DL (ref 70–99)
HCT VFR BLD CALC: 36.3 % (ref 37–47)
HEMOGLOBIN: 11.4 G/DL (ref 12–16)
IMMATURE GRANULOCYTES #: 0.1 K/UL
LYMPHOCYTES ABSOLUTE: 2.3 K/UL (ref 1.1–4.5)
LYMPHOCYTES RELATIVE PERCENT: 30.5 % (ref 20–40)
MCH RBC QN AUTO: 29.8 PG (ref 27–31)
MCHC RBC AUTO-ENTMCNC: 31.4 G/DL (ref 33–37)
MCV RBC AUTO: 95 FL (ref 81–99)
MONOCYTES ABSOLUTE: 0.6 K/UL (ref 0–0.9)
MONOCYTES RELATIVE PERCENT: 8.5 % (ref 0–10)
NEUTROPHILS ABSOLUTE: 4.2 K/UL (ref 1.5–7.5)
NEUTROPHILS RELATIVE PERCENT: 55.8 % (ref 50–65)
PDW BLD-RTO: 16.6 % (ref 11.5–14.5)
PERFORMED ON: ABNORMAL
PLATELET # BLD: 244 K/UL (ref 130–400)
PMV BLD AUTO: 10.3 FL (ref 9.4–12.3)
POTASSIUM REFLEX MAGNESIUM: 4.4 MMOL/L (ref 3.5–5)
RBC # BLD: 3.82 M/UL (ref 4.2–5.4)
SODIUM BLD-SCNC: 136 MMOL/L (ref 136–145)
WBC # BLD: 7.5 K/UL (ref 4.8–10.8)

## 2020-10-06 PROCEDURE — 6370000000 HC RX 637 (ALT 250 FOR IP): Performed by: FAMILY MEDICINE

## 2020-10-06 PROCEDURE — 82947 ASSAY GLUCOSE BLOOD QUANT: CPT

## 2020-10-06 PROCEDURE — 80048 BASIC METABOLIC PNL TOTAL CA: CPT

## 2020-10-06 PROCEDURE — 1210000000 HC MED SURG R&B

## 2020-10-06 PROCEDURE — 97165 OT EVAL LOW COMPLEX 30 MIN: CPT

## 2020-10-06 PROCEDURE — 85025 COMPLETE CBC W/AUTO DIFF WBC: CPT

## 2020-10-06 PROCEDURE — 6360000002 HC RX W HCPCS: Performed by: STUDENT IN AN ORGANIZED HEALTH CARE EDUCATION/TRAINING PROGRAM

## 2020-10-06 PROCEDURE — 2580000003 HC RX 258: Performed by: STUDENT IN AN ORGANIZED HEALTH CARE EDUCATION/TRAINING PROGRAM

## 2020-10-06 PROCEDURE — 6370000000 HC RX 637 (ALT 250 FOR IP): Performed by: STUDENT IN AN ORGANIZED HEALTH CARE EDUCATION/TRAINING PROGRAM

## 2020-10-06 PROCEDURE — 36415 COLL VENOUS BLD VENIPUNCTURE: CPT

## 2020-10-06 PROCEDURE — 92507 TX SP LANG VOICE COMM INDIV: CPT

## 2020-10-06 PROCEDURE — 92526 ORAL FUNCTION THERAPY: CPT

## 2020-10-06 PROCEDURE — 93010 ELECTROCARDIOGRAM REPORT: CPT | Performed by: INTERNAL MEDICINE

## 2020-10-06 RX ADMIN — CARVEDILOL 6.25 MG: 6.25 TABLET, FILM COATED ORAL at 09:32

## 2020-10-06 RX ADMIN — Medication 10 UNITS: at 20:57

## 2020-10-06 RX ADMIN — SODIUM CHLORIDE, PRESERVATIVE FREE 10 ML: 5 INJECTION INTRAVENOUS at 21:23

## 2020-10-06 RX ADMIN — Medication 5 MG: at 20:56

## 2020-10-06 RX ADMIN — ROSUVASTATIN CALCIUM 20 MG: 20 TABLET, FILM COATED ORAL at 09:30

## 2020-10-06 RX ADMIN — LOSARTAN POTASSIUM 25 MG: 25 TABLET, FILM COATED ORAL at 09:30

## 2020-10-06 RX ADMIN — CEFTRIAXONE 1 G: 1 INJECTION, POWDER, FOR SOLUTION INTRAMUSCULAR; INTRAVENOUS at 22:02

## 2020-10-06 RX ADMIN — ENOXAPARIN SODIUM 40 MG: 40 INJECTION SUBCUTANEOUS at 09:30

## 2020-10-06 ASSESSMENT — PAIN SCALES - GENERAL: PAINLEVEL_OUTOF10: 0

## 2020-10-06 ASSESSMENT — PAIN DESCRIPTION - PROGRESSION: CLINICAL_PROGRESSION: GRADUALLY IMPROVING

## 2020-10-06 NOTE — PROGRESS NOTES
Hospitalist Progress Note  10/6/2020 2:25 PM  Subjective:   Admit Date: 10/5/2020  PCP: No primary care provider on file. Chief Complaint: altered mental status    Subjective: Patient is denying any complaints but remains quite confused. Her son had arrived to take her back to [place], but she is no less confused yet. He talked with her at length and eventually agreed to SNF placement. She is afebrile. History is otherwise unchanged. Cumulative Hospital History:   10-5: Brought to ED for evaluation after the police found her wandering. Active missing person report, had driven here from [address] looking for her son (Who lives 4 hours away). No formal diagnosis of dementia but worsening confusion noted. Lives with a , with a nurse next door. Signs of UTI, placed on ceftriaxone and admitted to med/surg. 10-6: Son arrived with the plan of taking the patient back home but she is no less confused yet. Urine culture is showing unspecified growth, full results pending. Patient is trying to leave so will order a sitter to redirect her until she improves further. ROS: 14 point review of systems is negative except as specifically addressed above. Dietary Nutrition Supplements: Clear Liquid Oral Supplement  DIET GENERAL;     Intake/Output Summary (Last 24 hours) at 10/6/2020 1425  Last data filed at 10/6/2020 1157  Gross per 24 hour   Intake 1642 ml   Output 750 ml   Net 892 ml     Medications:   dextrose      sodium chloride 125 mL/hr at 10/05/20 2037     Current Facility-Administered Medications   Medication Dose Route Frequency Provider Last Rate Last Dose    glucose (GLUTOSE) 40 % oral gel 15 g  15 g Oral PRN Tawnya Spring MD        dextrose 50 % IV solution  12.5 g Intravenous PRN Tawnya Spring MD        glucagon (rDNA) injection 1 mg  1 mg Intramuscular PRN Tawnya Spring MD        dextrose 5 % solution  100 mL/hr Intravenous PRJERMAN Spring MD        insulin lispro (HUMALOG) injection vial 0-6 Units  0-6 Units Subcutaneous TID  Law Quispe MD   4 Units at 10/05/20 1314    insulin lispro (HUMALOG) injection vial 0-3 Units  0-3 Units Subcutaneous Nightly Law Quispe MD        cefTRIAXone (ROCEPHIN) 1 g in sterile water 10 mL IV syringe  1 g Intravenous Q24H Law Quispe MD   1 g at 10/05/20 2046    sodium chloride flush 0.9 % injection 10 mL  10 mL Intravenous 2 times per day Law Quispe MD   10 mL at 10/05/20 1000    sodium chloride flush 0.9 % injection 10 mL  10 mL Intravenous PRN Law Quispe MD        acetaminophen (TYLENOL) tablet 650 mg  650 mg Oral Q6H PRN Law Quispe MD   650 mg at 10/05/20 2204    Or    acetaminophen (TYLENOL) suppository 650 mg  650 mg Rectal Q6H PRN Law Quispe MD        polyethylene glycol (GLYCOLAX) packet 17 g  17 g Oral Daily PRN Law Quispe MD        promethazine (PHENERGAN) tablet 12.5 mg  12.5 mg Oral Q6H PRN Law Quispe MD        Or    ondansetron [facility]) injection 4 mg  4 mg Intravenous Q6H PRN Law Quispe MD        enoxaparin (LOVENOX) injection 40 mg  40 mg Subcutaneous Daily Law Quispe MD   40 mg at 10/06/20 0930    0.9 % sodium chloride infusion   Intravenous Continuous Law Quispe  mL/hr at 10/05/20 2037      potassium chloride (KLOR-CON M) extended release tablet 40 mEq  40 mEq Oral PRN Law Quispe MD        Or    potassium bicarb-citric acid (EFFER-K) effervescent tablet 40 mEq  40 mEq Oral PRN Law Quispe MD        Or   [place] potassium chloride 10 mEq/100 mL IVPB (Peripheral Line)  10 mEq Intravenous PRN Law Quispe MD        magnesium sulfate 2 g in 50 mL IVPB premix  2 g Intravenous PRN Law Quispe MD        carvedilol (COREG) tablet 6.25 mg  6.25 mg Oral BID  Venkata Olivera DO   6.25 mg at 10/06/20 0932    losartan (COZAAR) tablet 25 mg  25 mg Oral Daily Venkata Olivera DO   25 mg at 10/06/20 0930    rosuvastatin (CRESTOR) tablet 20 mg 20 mg Oral Daily Venkata Olivera, DO   20 mg at 10/06/20 0930    insulin glargine (LANTUS) injection vial 10 Units  10 Units Subcutaneous Nightly Venkata Olivera, DO   10 Units at 10/05/20 2038    melatonin tablet 5 mg  5 mg Oral Nightly PRN Gabbie Salazar MD   5 mg at 10/05/20 2231        Labs:     Recent Labs     10/05/20  0250 10/06/20  0212   WBC 8.6 7.5   RBC 4.54 3.82*   HGB 13.5 11.4*   HCT 42.9 36.3*   MCV 94.5 95.0   MCH 29.7 29.8   MCHC 31.5* 31.4*    244     Recent Labs     10/05/20  0155 10/05/20  0250 10/06/20  0212   NA  --  136 136   K  --  4.5 4.4   ANIONGAP  --  11 9   CL  --  102 108   CO2  --  23 19*   BUN  --  19 21   CREATININE  --  0.9 0.8   GLUCOSE 146 211* 114*   CALCIUM  --  12.1* 10.4*     Recent Labs     10/05/20  0250   MG 1.9   PHOS 2.6     Recent Labs     10/05/20  0250   AST 22   ALT 23   BILITOT 0.6   ALKPHOS 59     ABGs:No results for input(s): PH, PO2, PCO2, HCO3, BE, O2SAT in the last 72 hours. Troponin T: No results for input(s): TROPONINI in the last 72 hours. INR: No results for input(s): INR in the last 72 hours. Lactic Acid: No results for input(s): LACTA in the last 72 hours.     Objective:   Vitals: /89   Pulse 104   Temp 97 °F (36.1 °C) (Temporal)   Resp 16   Ht 5' 7\" (1.702 m)   Wt 181 lb 4 oz (82.2 kg)   SpO2 98%   BMI 28.39 kg/m²   24HR INTAKE/OUTPUT:      Intake/Output Summary (Last 24 hours) at 10/6/2020 1425  Last data filed at 10/6/2020 1157  Gross per 24 hour   Intake 1642 ml   Output 750 ml   Net 892 ml     General appearance: alert and cooperative with exam, hard of hearing  HEENT: atraumatic, eyes with clear conjunctiva and normal lids, pupils and irises normal, external ears and nose are normal, lips normal  Neck without masses, lympadenopathy, bruit, thyroid normal  Lungs: no increased work of breathing, \"clear to auscultation bilaterally\" without rales, rhonchi or wheezes  Heart: regular rate and rhythm, S1, S2 normal, no murmur, click, rub or gallop  Abdomen: soft, non-tender; bowel sounds normal; no masses,  no organomegaly  Extremities: extremities normal, atraumatic, no cyanosis or edema  Neurologic: normal sensation, alert and oriented but very confused, affect and mood appropriate  Skin: no rashes, nodules    Assessment and Plan:   Principal Problem:    Sepsis with metabolic encephalopathy ([facility] [address]  Active Problems:    UTI (urinary tract infection)    Hypercalcemia    Diabetes mellitus ([facility] [address]  Resolved Problems:    * No resolved hospital problems. *      Day #2 ceftriaxone empiric therapy for urinary tract infection causing metabolic encephalopathy. Culture and sensitivities pending. Sitter for confusion as patient is trying to leave. Discussed with the patient's son and had discharge planning speak with him. He is agreeable to SNF placement on discharge, though we will have to arrange a way to get her back to [place]. He is going to take her car so that she cannot drive off.     Advance Directive: Full Code    DVT prophylaxis: enoxaparin    Discharge planning: to SNF      DO Liat Don Hospitalist

## 2020-10-06 NOTE — CARE COORDINATION
Pt and son are agreeable to go to a SNF. Pt has volunteered at [facility] in [facility] and would like a referral sent to their facility.  SW will send referral.     Ph: [phone]  F: [phone]

## 2020-10-06 NOTE — PROGRESS NOTES
Speech Language Pathology  Facility/Department: [facility] 4 ONCOLOGY UNIT  Swallow treatment/speech/language treatment    NAME: Odilon Lopez  : 1945  MRN: [id number]    ADMISSION DATE: 10/5/2020  ADMITTING DIAGNOSIS: has Sepsis with metabolic encephalopathy ([facility] [address] UTI (urinary tract infection); Diabetes mellitus ([facility] [address] and Hypercalcemia on their problem list.      Date of Eval: 10/6/2020  Evaluating Therapist: Marguerite Cervantes    Current Diet level:   regular with thin liquids       Impression  Monitored patients swallow function. Patient presenting with no overt s/s of aspiration with any regular solid, soft solid, or thin liquids today. At this time recommend continuation of regular diet thin liquids. Assist with meal set up. Patient presenting with decreased ability in following 2 step commands. 1 step commands were completed with 100% accuracy, independently . Did note hard of hearing during all tasks. Patient required repetition of direction at times. Patient was oriented to birthday, age, address, phone number, and year. Patient had increased difficulty with verbalizing correct city, state, hospital, BIJAL, and ESTER, independently. Patient presenting with decreased sustained attention during all tasks and required cues to stay on task. Decreased recall of recent/daily events and events leading up to hospitalization. Goals:  Short-term Goals  Timeframe for Short-term Goals: 1-2x/day for 3 days  Goal 1: Patient will tolerate regular solid consistency and thin liquids with min S/S penetration/aspiration during PO intake. Goal 2: Patient staff will follow swallow safety recommendations to decrease risk of penetration/aspiration during PO intake.   Goal 3: Monitor speech/language/cognitive functioning.         Electronically signed by ALEX Landa on 10/6/2020 at 1:28 PM

## 2020-10-06 NOTE — PLAN OF CARE
Problem: Falls - Risk of:  Goal: Will remain free from falls  Description: Will remain free from falls  Outcome: Ongoing  Goal: Absence of physical injury  Description: Absence of physical injury  Outcome: Ongoing     Problem: Coping:  Goal: Ability to remain calm will improve  Description: Ability to remain calm will improve  Outcome: Ongoing     Problem: Safety:  Goal: Ability to remain free from injury will improve  Description: Ability to remain free from injury will improve  Outcome: Ongoing     Problem: Self-Care:  Goal: Ability to participate in self-care as condition permits will improve  Description: Ability to participate in self-care as condition permits will improve  Outcome: Ongoing     Problem: Nutrition  Goal: Optimal nutrition therapy  Outcome: Ongoing

## 2020-10-06 NOTE — PROGRESS NOTES
Occupational Therapy   Occupational Therapy Initial Assessment  Date: 10/6/2020   Patient Name: Ada Casarez  MRN: [id number]     : 1945    Date of Service: 10/6/2020    Discharge Recommendations:  Patient would benefit from continued therapy after discharge       Assessment   Assessment: Patient is doing well with regard to physical status and ability to perform ADL tasks in a supervised setting, but is limited by confusion. Would benefit from further therapy to improve safety. Treatment Diagnosis: Sepsis with metabolic encephalopathy  REQUIRES OT FOLLOW UP: Yes  Activity Tolerance  Activity Tolerance: Treatment limited secondary to decreased cognition  Safety Devices  Safety Devices in place: Not Applicable(left with nursing staff)           Patient Diagnosis(es): The primary encounter diagnosis was Delirium. Diagnoses of Acute cystitis without hematuria, Type 2 diabetes mellitus with hyperglycemia, without long-term current use of insulin ([address] and Hypercalcemia were also pertinent to this visit. has a past medical history of Diabetes ([address] and Hypertension. has a past surgical history that includes Coronary artery bypass graft; Hysterectomy; and Appendectomy. Treatment Diagnosis: Sepsis with metabolic encephalopathy      Restrictions       Subjective   General  Chart Reviewed: Yes  Patient assessed for rehabilitation services?: Yes  Family / Caregiver Present: No  General Comment  Comments: Police found patient wandering. Patient Currently in Pain: No  Vital Signs  Patient Currently in Pain: No  Social/Functional History          Objective        Orientation  Orientation Level: Oriented to person;Disoriented to place; Disoriented to time;Disoriented to situation     Balance  Sitting Balance: Independent  Standing Balance: Independent  Functional Mobility  Assist Level:  Independent  Functional Mobility Comments: Supervision for cognitive aspects, but no losses of balance  Toilet Transfers  Toilet - Technique: Ambulating  Toilet Transfer: Independent  ADL  Feeding: Supervision  Grooming: Supervision  UE Bathing: Supervision  LE Bathing: Supervision  UE Dressing: Supervision  LE Dressing: Supervision  Toileting: Supervision  Additional Comments: Requires general supervision for safety  Tone RUE  RUE Tone: Normotonic  Tone LUE  LUE Tone: Normotonic  Coordination  Movements Are Fluid And Coordinated: Yes        Transfers  Stand Step Transfers: Independent     Cognition  Overall Cognitive Status: Exceptions  Attention Span: Attends with cues to redirect; Difficulty attending to directions  Memory: Decreased recall of recent events  Safety Judgement: Decreased awareness of need for safety  Insights: Decreased awareness of deficits  Cognition Comment: Awake, alert, but confused.                  LUE PROM (degrees)  LUE PROM: WFL  RUE PROM (degrees)  RUE PROM: WFL  RUE AROM (degrees)  RUE AROM : WFL  LUE Strength  Gross LUE Strength: WFL  RUE Strength  Gross RUE Strength: WFL                   Plan   Plan  Times per week: 3-5    G-Code     OutComes Score                                                  AM-PAC Score             Goals  Short term goals  Short term goal 1: Patient will demonstrate increased safety awareness and orientation  to complete ADL routines with only occasional prompts       Therapy Time   Individual Concurrent Group Co-treatment   Time In           Time Out           Minutes                   Abram Gautam OT Electronically signed by Abram Gautam OT on 10/6/2020 at 4:22 PM

## 2020-10-06 NOTE — CARE COORDINATION
Met with patient's son, Yael Pierson, and discussed option of patient discharging to a SNF for Rehab. He is agreeable to this plan. Patient has previously been at [facility] s/p fractured hip. Bud requested a referral to this SNF and granted permission to send a referral to any other facilities within the [place], [place] area if [facility] is unable to accept patient. Segundo Sauceda, informed.   Electronically signed by Idalia Killian RN on 10/6/2020 at 11:19 AM

## 2020-10-06 NOTE — CARE COORDINATION
Spoke to patient regarding MD orders for home health services. She was agreeable and did not have a preference of agencies. I was told later that plan was for patient to go to a SNF for rehab. Will sign off.   Electronically signed by Yanique Garza RN on 10/6/20 at 2:05 PM CDT

## 2020-10-07 LAB
ANION GAP SERPL CALCULATED.3IONS-SCNC: 13 MMOL/L (ref 7–19)
BASOPHILS ABSOLUTE: 0.1 K/UL (ref 0–0.2)
BASOPHILS RELATIVE PERCENT: 0.4 % (ref 0–1)
BUN BLDV-MCNC: 14 MG/DL (ref 8–23)
CALCIUM SERPL-MCNC: 10.4 MG/DL (ref 8.8–10.2)
CHLORIDE BLD-SCNC: 109 MMOL/L (ref 98–111)
CO2: 18 MMOL/L (ref 22–29)
CREAT SERPL-MCNC: 0.6 MG/DL (ref 0.5–0.9)
EKG P AXIS: 62 DEGREES
EKG P-R INTERVAL: 110 MS
EKG Q-T INTERVAL: 296 MS
EKG QRS DURATION: 96 MS
EKG QTC CALCULATION (BAZETT): 401 MS
EKG T AXIS: 13 DEGREES
EOSINOPHILS ABSOLUTE: 0.2 K/UL (ref 0–0.6)
EOSINOPHILS RELATIVE PERCENT: 1.5 % (ref 0–5)
GFR AFRICAN AMERICAN: >59
GFR NON-AFRICAN AMERICAN: >60
GLUCOSE BLD-MCNC: 126 MG/DL (ref 70–99)
GLUCOSE BLD-MCNC: 128 MG/DL (ref 70–99)
GLUCOSE BLD-MCNC: 146 MG/DL (ref 74–109)
GLUCOSE BLD-MCNC: 192 MG/DL (ref 70–99)
GLUCOSE BLD-MCNC: 250 MG/DL (ref 70–99)
HCT VFR BLD CALC: 36.8 % (ref 37–47)
HEMOGLOBIN: 11.9 G/DL (ref 12–16)
IMMATURE GRANULOCYTES #: 0.1 K/UL
LYMPHOCYTES ABSOLUTE: 1.8 K/UL (ref 1.1–4.5)
LYMPHOCYTES RELATIVE PERCENT: 14.4 % (ref 20–40)
MCH RBC QN AUTO: 30.4 PG (ref 27–31)
MCHC RBC AUTO-ENTMCNC: 32.3 G/DL (ref 33–37)
MCV RBC AUTO: 94.1 FL (ref 81–99)
MONOCYTES ABSOLUTE: 1 K/UL (ref 0–0.9)
MONOCYTES RELATIVE PERCENT: 7.6 % (ref 0–10)
NEUTROPHILS ABSOLUTE: 9.4 K/UL (ref 1.5–7.5)
NEUTROPHILS RELATIVE PERCENT: 75.6 % (ref 50–65)
PDW BLD-RTO: 16.3 % (ref 11.5–14.5)
PERFORMED ON: ABNORMAL
PLATELET # BLD: 268 K/UL (ref 130–400)
PMV BLD AUTO: 10.1 FL (ref 9.4–12.3)
POTASSIUM REFLEX MAGNESIUM: 3.8 MMOL/L (ref 3.5–5)
RBC # BLD: 3.91 M/UL (ref 4.2–5.4)
SODIUM BLD-SCNC: 140 MMOL/L (ref 136–145)
WBC # BLD: 12.5 K/UL (ref 4.8–10.8)

## 2020-10-07 PROCEDURE — 6360000002 HC RX W HCPCS: Performed by: STUDENT IN AN ORGANIZED HEALTH CARE EDUCATION/TRAINING PROGRAM

## 2020-10-07 PROCEDURE — 6370000000 HC RX 637 (ALT 250 FOR IP): Performed by: STUDENT IN AN ORGANIZED HEALTH CARE EDUCATION/TRAINING PROGRAM

## 2020-10-07 PROCEDURE — 2580000003 HC RX 258: Performed by: STUDENT IN AN ORGANIZED HEALTH CARE EDUCATION/TRAINING PROGRAM

## 2020-10-07 PROCEDURE — 80048 BASIC METABOLIC PNL TOTAL CA: CPT

## 2020-10-07 PROCEDURE — 6370000000 HC RX 637 (ALT 250 FOR IP): Performed by: FAMILY MEDICINE

## 2020-10-07 PROCEDURE — 85025 COMPLETE CBC W/AUTO DIFF WBC: CPT

## 2020-10-07 PROCEDURE — 6360000002 HC RX W HCPCS: Performed by: INTERNAL MEDICINE

## 2020-10-07 PROCEDURE — 1210000000 HC MED SURG R&B

## 2020-10-07 PROCEDURE — 82947 ASSAY GLUCOSE BLOOD QUANT: CPT

## 2020-10-07 PROCEDURE — 6370000000 HC RX 637 (ALT 250 FOR IP): Performed by: INTERNAL MEDICINE

## 2020-10-07 PROCEDURE — 36415 COLL VENOUS BLD VENIPUNCTURE: CPT

## 2020-10-07 RX ORDER — CALCITONIN SALMON 200 [USP'U]/ML
200 INJECTION, SOLUTION INTRAMUSCULAR; SUBCUTANEOUS DAILY
Status: COMPLETED | OUTPATIENT
Start: 2020-10-07 | End: 2020-10-08

## 2020-10-07 RX ORDER — CARVEDILOL 6.25 MG/1
6.25 TABLET ORAL ONCE
Status: COMPLETED | OUTPATIENT
Start: 2020-10-07 | End: 2020-10-07

## 2020-10-07 RX ORDER — CARVEDILOL 12.5 MG/1
12.5 TABLET ORAL 2 TIMES DAILY WITH MEALS
Status: DISCONTINUED | OUTPATIENT
Start: 2020-10-07 | End: 2020-10-14 | Stop reason: HOSPADM

## 2020-10-07 RX ADMIN — ENOXAPARIN SODIUM 40 MG: 40 INJECTION SUBCUTANEOUS at 09:58

## 2020-10-07 RX ADMIN — INSULIN LISPRO 1 UNITS: 100 INJECTION, SOLUTION INTRAVENOUS; SUBCUTANEOUS at 18:11

## 2020-10-07 RX ADMIN — ROSUVASTATIN CALCIUM 20 MG: 20 TABLET, FILM COATED ORAL at 09:58

## 2020-10-07 RX ADMIN — CEFTRIAXONE 1 G: 1 INJECTION, POWDER, FOR SOLUTION INTRAMUSCULAR; INTRAVENOUS at 20:18

## 2020-10-07 RX ADMIN — LOSARTAN POTASSIUM 25 MG: 25 TABLET, FILM COATED ORAL at 09:58

## 2020-10-07 RX ADMIN — CALCITONIN SALMON 200 UNITS: 200 INJECTION, SOLUTION INTRAMUSCULAR; SUBCUTANEOUS at 12:14

## 2020-10-07 RX ADMIN — Medication 5 MG: at 20:17

## 2020-10-07 RX ADMIN — CARVEDILOL 12.5 MG: 12.5 TABLET, FILM COATED ORAL at 18:13

## 2020-10-07 RX ADMIN — CARVEDILOL 6.25 MG: 6.25 TABLET, FILM COATED ORAL at 09:57

## 2020-10-07 RX ADMIN — SODIUM CHLORIDE, PRESERVATIVE FREE 10 ML: 5 INJECTION INTRAVENOUS at 10:04

## 2020-10-07 RX ADMIN — CARVEDILOL 6.25 MG: 6.25 TABLET, FILM COATED ORAL at 12:14

## 2020-10-07 RX ADMIN — SODIUM CHLORIDE, PRESERVATIVE FREE 10 ML: 5 INJECTION INTRAVENOUS at 20:18

## 2020-10-07 RX ADMIN — Medication 10 UNITS: at 20:18

## 2020-10-07 RX ADMIN — CARVEDILOL 12.5 MG: 12.5 TABLET, FILM COATED ORAL at 12:13

## 2020-10-07 ASSESSMENT — PAIN SCALES - GENERAL: PAINLEVEL_OUTOF10: 0

## 2020-10-07 NOTE — PROGRESS NOTES
Comprehensive Nutrition Assessment    Type and Reason for Visit:  Reassess     Nutrition Assessment:  Pt improving from a nutritional standpoint with PO intake >50% most meals. Modifying diet to Carb Control. Discontinuing ONS. Malnutrition Assessment:  Malnutrition Status:  No malnutrition    Context:  Acute Illness       Estimated Daily Nutrient Needs:  Energy (kcal):  8398-7942; Weight Used for Energy Requirements:  Current     Protein (g):  ; Weight Used for Protein Requirements:  Ideal(1.3-2.0)        Fluid (ml/day):  8055-4079; Weight Used for Fluid Requirements:  Current      Wounds:  None       Current Nutrition Therapies:    Dietary Nutrition Supplements: Clear Liquid Oral Supplement  DIET GENERAL; Anthropometric Measures:  · Height: 5' 7\" (170.2 cm)  · Current Body Weight: 181 lb 4 oz (82.2 kg)   · Ideal Body Weight: 135 lbs; % Ideal Body Weight 134.3 %   · BMI: 28.4    · BMI Categories: Overweight (BMI 25.0-29. 9)       Nutrition Diagnosis:   · Inadequate oral intake related to cognitive or neurological impairment as evidenced by poor intake prior to admission      Nutrition Interventions:   Food and/or Nutrient Delivery:  Discontinue Oral Nutrition Supplement, Modify Current Diet  Nutrition Education/Counseling:  No recommendation at this time   Coordination of Nutrition Care:  Continued Inpatient Monitoring    Goals:  PO intake >50%, wt stable       Nutrition Monitoring and Evaluation:   Food/Nutrient Intake Outcomes:  Food and Nutrient Intake, Supplement Intake  Physical Signs/Symptoms Outcomes:  Biochemical Data, Weight, Skin, Nutrition Focused Physical Findings     Discharge Planning:     Too soon to determine     Electronically signed by Acacia Roy RD, LD on 10/7/20 at 10:08 AM CDT    Contact: [phone]

## 2020-10-07 NOTE — PLAN OF CARE
Problem: Falls - Risk of:  Goal: Will remain free from falls  Description: Will remain free from falls  Outcome: Ongoing  Goal: Absence of physical injury  Description: Absence of physical injury  Outcome: Ongoing     Problem: Coping:  Goal: Ability to remain calm will improve  Description: Ability to remain calm will improve  Outcome: Ongoing     Problem: Safety:  Goal: Ability to remain free from injury will improve  Description: Ability to remain free from injury will improve  Outcome: Ongoing     Problem: Self-Care:  Goal: Ability to participate in self-care as condition permits will improve  Description: Ability to participate in self-care as condition permits will improve  Outcome: Ongoing     Problem: Nutrition  Goal: Optimal nutrition therapy  10/7/2020 1429 by Kyle Bautista RN  Outcome: Ongoing  10/7/2020 1009 by Diogo Lowe RD, LD  Outcome: Ongoing     Problem: Pain:  Goal: Pain level will decrease  Description: Pain level will decrease  Outcome: Ongoing  Goal: Control of acute pain  Description: Control of acute pain  Outcome: Ongoing  Goal: Control of chronic pain  Description: Control of chronic pain  Outcome: Ongoing

## 2020-10-07 NOTE — PROGRESS NOTES
Hospitalist Progress Note  10/7/2020 8:15 AM  Subjective:   Admit Date: 10/5/2020  PCP: No primary care provider on file. Chief Complaint: altered mental status    Subjective: Sitting up in chair. Very talkative but remains confused. No acute complaints. Repeating that she does not want to follow-up here with the physician, she wants to follow-up at home with her primary care physician who knows her well. I also spoke to her friend and updated him on the phone while was in the room. He tells me that she has had problems with high calcium in the past and has been told not to take anything with calcium in it. [facility]:   10-5: Brought to ED for evaluation after the police found her wandering. Active missing person report, had driven here from [address] looking for her son (Who lives 4 hours away). No formal diagnosis of dementia but worsening confusion noted. Lives with a , with a nurse next door. Signs of UTI, placed on ceftriaxone and admitted to med/surg. 10-6: Son arrived with the plan of taking the patient back home but she is no less confused yet. Urine culture is showing unspecified growth, full results pending. Patient is trying to leave so will order a sitter to redirect her until she improves further. 10-7: awaiting urine culture data. Calcium remains high despite dose of Zometa and IVFs. Giving calcitonin. ROS: Six point review of systems is negative except as specifically addressed above. Dietary Nutrition Supplements: Clear Liquid Oral Supplement  DIET GENERAL;     Intake/Output Summary (Last 24 hours) at 10/7/2020 0815  Last data filed at 10/6/2020 2057  Gross per 24 hour   Intake 1180 ml   Output --   Net 1180 ml     Medications:   dextrose      sodium chloride 125 mL/hr at 10/05/20 2037     Current Facility-Administered Medications   Medication Dose Route Frequency Provider Last Rate Last Dose    glucose (GLUTOSE) 40 % oral gel 15 g  15 g Oral PRN Amilcar Mckinney MD        dextrose 50 % IV solution  12.5 g Intravenous PRN Amilcar Mckinney MD        glucagon (rDNA) injection 1 mg  1 mg Intramuscular PRN Amilcar Mckinney MD        dextrose 5 % solution  100 mL/hr Intravenous PRN Amilcar Mckinney MD        insulin lispro (HUMALOG) injection vial 0-6 Units  0-6 Units Subcutaneous TID WC Amilcar Mckinney MD   4 Units at 10/05/20 1314    insulin lispro (HUMALOG) injection vial 0-3 Units  0-3 Units Subcutaneous Nightly Amilcar Mckinney MD        cefTRIAXone (ROCEPHIN) 1 g in sterile water 10 mL IV syringe  1 g Intravenous Q24H Amilcar Mckinney MD   1 g at 10/06/20 2202    sodium chloride flush 0.9 % injection 10 mL  10 mL Intravenous 2 times per day Amilcar Mckinney MD   10 mL at 10/06/20 2123    sodium chloride flush 0.9 % injection 10 mL  10 mL Intravenous PRN Amilcar Mckinney MD        acetaminophen (TYLENOL) tablet 650 mg  650 mg Oral Q6H PRN Amilcar Mckinney MD   650 mg at 10/05/20 2204    Or    acetaminophen (TYLENOL) suppository 650 mg  650 mg Rectal Q6H PRN Amilcar Mckinney MD        polyethylene glycol (GLYCOLAX) packet 17 g  17 g Oral Daily PRN Amilcar Mckinney MD        promethazine (PHENERGAN) tablet 12.5 mg  12.5 mg Oral Q6H PRN Amilcar Mckinney MD        Or    ondansetron [facility]) injection 4 mg  4 mg Intravenous Q6H PRN Amilcar Mckinney MD        enoxaparin (LOVENOX) injection 40 mg  40 mg Subcutaneous Daily Amilcar Mckinney MD   40 mg at 10/06/20 0930    0.9 % sodium chloride infusion   Intravenous Continuous Amilcar Mckinney  mL/hr at 10/05/20 2037      potassium chloride (KLOR-CON M) extended release tablet 40 mEq  40 mEq Oral PRN Amilcar Mckinney MD        Or    potassium bicarb-citric acid (EFFER-K) effervescent tablet 40 mEq  40 mEq Oral PRN Amilcar Mckinney MD        Or   [facility] potassium chloride 10 mEq/100 mL IVPB (Peripheral Line)  10 mEq Intravenous PRN Amilcar Mckinney MD        magnesium sulfate 2 g in 50 mL IVPB premix  2 g Intravenous PRN Shanel Fuentes MD        carvedilol (COREG) tablet 6.25 mg  6.25 mg Oral BID WC [place] Olivera, DO   6.25 mg at 10/06/20 0932    losartan (COZAAR) tablet 25 mg  25 mg Oral Daily [place] Olivera, DO   25 mg at 10/06/20 0930    rosuvastatin (CRESTOR) tablet 20 mg  20 mg Oral Daily [facility], DO   20 mg at 10/06/20 0930    insulin glargine (LANTUS) injection vial 10 Units  10 Units Subcutaneous Nightly [place] Olivera, DO   10 Units at 10/06/20 2057    melatonin tablet 5 mg  5 mg Oral Nightly PRN Shanel Fuentes MD   5 mg at 10/06/20 2056        Labs:     Recent Labs     10/05/20  0250 10/06/20  0212 10/07/20  0319   WBC 8.6 7.5 12.5*   RBC 4.54 3.82* 3.91*   HGB 13.5 11.4* 11.9*   HCT 42.9 36.3* 36.8*   MCV 94.5 95.0 94.1   MCH 29.7 29.8 30.4   MCHC 31.5* 31.4* 32.3*    244 268     Recent Labs     10/05/20  0250 10/06/20  0212 10/07/20  0319    136 140   K 4.5 4.4 3.8   ANIONGAP 11 9 13    108 109   CO2 23 19* 18*   BUN 19 21 14   CREATININE 0.9 0.8 0.6   GLUCOSE 211* 114* 146*   CALCIUM 12.1* 10.4* 10.4*     Recent Labs     10/05/20  0250   MG 1.9   PHOS 2.6     Recent Labs     10/05/20  0250   AST 22   ALT 23   BILITOT 0.6   ALKPHOS 59     Objective:   Vitals: /79   Pulse 110   Temp 97.1 °F (36.2 °C) (Temporal)   Resp 20   Ht 5' 7\" (1.702 m)   Wt 181 lb 4 oz (82.2 kg)   SpO2 99%   BMI 28.39 kg/m²   24HR INTAKE/OUTPUT:      Intake/Output Summary (Last 24 hours) at 10/7/2020 0815  Last data filed at 10/6/2020 2057  Gross per 24 hour   Intake 1180 ml   Output --   Net 1180 ml     General appearance: sitting up in chair in NAD. Verbose.    Head: NC/AT   Eyes: normal sclera  Mouth: MMM   Lungs: no increased work of breathing, clear to auscultation bilaterally  Heart: tachycardia, regular rhythm  Abdomen: soft, non-tender; bowel sounds normal; no masses,  no organomegaly  Extremities: extremities normal, atraumatic, no cyanosis or edema  Neurologic: awake and alert but not fully oriented. Moving all extremities. Face/ tongue symmetric. Skin: no rashes    Assessment and Plan:   Principal Problem:    Sepsis with metabolic encephalopathy ([facility]  Active Problems:    UTI (urinary tract infection)    Hypercalcemia    Diabetes mellitus ([facility]  Resolved Problems:    * No resolved hospital problems. *    Altered mental status, 2/2 metabolic encephalopathy in setting of UTI and hypercalcemia, as well as likely underlying dementia  - Address potential contributing factors including UTI and hypercalcemia  -Supportive care     Sepsis 2/2 UTI - both POA with tachycardia, leukocytosis, UA c/w infection   - Rocephin, follow-up urine culture    Hypercalcemia - PTH elevated, c/w primary hyperparathyroidism.   - Will need to be evaluated by endocrinology on discharge. - Level improved with dose of Zometa, but still remains moderately elevated on IVFs. Will try calcitonin. Sinus tachycardia - hyperCa and infection could be contributing. Increasing dose of BB and tx above. Diabetes mellitus, with hyperglycemia  -POC glucose checks every 6 hours  -lantus and Sliding scale insulin as indicated    Advance Directive: Full Code    DVT prophylaxis: lovenox     Discharge planning: SNF. SW has made referrals. Still working on hypercalcemia, UTI culture, AMS. Signed:   Jie Noguera MD 10/7/2020 8:15 AM  Hospitalist

## 2020-10-07 NOTE — PROGRESS NOTES
Physician Progress Note      Anne Marie Chino  [facility] #:                  [id number]  :                       1945  ADMIT DATE:       10/5/2020 1:34 AM  DISCH DATE:  RESPONDING  PROVIDER #:        Bimal Haque          QUERY TEXT:    Patient admitted with AMS . Noted documentation of sepsis in Dr. Thaddeus Kaiser Note   10/6. Ayse Miramontes Please indicate one of the following and document in the medical   record: The medical record reflects the following:  Risk Factors: Encephalopathy, UTI, Age, DM  Clinical Indicators: UTI, Pulse 111, WBC 12.5  Treatment: Rocephin, Tylenol,  Options provided:  -- Sepsis POA  present as evidenced by, Please document evidence.   -- Sepsis was ruled out after study  -- Other - I will add my own diagnosis  -- Disagree - Not applicable / Not valid  -- Disagree - Clinically unable to determine / Unknown  -- Refer to Clinical Documentation Reviewer    PROVIDER RESPONSE TEXT:    Sepsis POA is present as evidenced by tachycardia, leukocytosis, UTI    Query created by: Ynes Casanova on 10/7/2020 8:20 AM      Electronically signed by:  Bimal Haque 10/7/2020 11:41 AM

## 2020-10-08 LAB
ANION GAP SERPL CALCULATED.3IONS-SCNC: 11 MMOL/L (ref 7–19)
BASOPHILS ABSOLUTE: 0.1 K/UL (ref 0–0.2)
BASOPHILS RELATIVE PERCENT: 0.5 % (ref 0–1)
BUN BLDV-MCNC: 18 MG/DL (ref 8–23)
CALCIUM IONIZED: 1.31 MMOL/L (ref 1.12–1.32)
CALCIUM SERPL-MCNC: 10 MG/DL (ref 8.8–10.2)
CHLORIDE BLD-SCNC: 109 MMOL/L (ref 98–111)
CO2: 19 MMOL/L (ref 22–29)
CREAT SERPL-MCNC: 0.7 MG/DL (ref 0.5–0.9)
EOSINOPHILS ABSOLUTE: 0.2 K/UL (ref 0–0.6)
EOSINOPHILS RELATIVE PERCENT: 1.5 % (ref 0–5)
GFR AFRICAN AMERICAN: >59
GFR NON-AFRICAN AMERICAN: >60
GLUCOSE BLD-MCNC: 128 MG/DL (ref 70–99)
GLUCOSE BLD-MCNC: 135 MG/DL (ref 70–99)
GLUCOSE BLD-MCNC: 144 MG/DL (ref 74–109)
GLUCOSE BLD-MCNC: 170 MG/DL (ref 70–99)
GLUCOSE BLD-MCNC: 189 MG/DL (ref 70–99)
HCT VFR BLD CALC: 34.1 % (ref 37–47)
HEMOGLOBIN: 11 G/DL (ref 12–16)
IMMATURE GRANULOCYTES #: 0 K/UL
LYMPHOCYTES ABSOLUTE: 1.7 K/UL (ref 1.1–4.5)
LYMPHOCYTES RELATIVE PERCENT: 17.3 % (ref 20–40)
MCH RBC QN AUTO: 30 PG (ref 27–31)
MCHC RBC AUTO-ENTMCNC: 32.3 G/DL (ref 33–37)
MCV RBC AUTO: 92.9 FL (ref 81–99)
MONOCYTES ABSOLUTE: 0.8 K/UL (ref 0–0.9)
MONOCYTES RELATIVE PERCENT: 7.7 % (ref 0–10)
NEUTROPHILS ABSOLUTE: 7.1 K/UL (ref 1.5–7.5)
NEUTROPHILS RELATIVE PERCENT: 72.7 % (ref 50–65)
ORGANISM: ABNORMAL
ORGANISM: ABNORMAL
PDW BLD-RTO: 16.3 % (ref 11.5–14.5)
PERFORMED ON: ABNORMAL
PLATELET # BLD: 233 K/UL (ref 130–400)
PMV BLD AUTO: 10.3 FL (ref 9.4–12.3)
POTASSIUM REFLEX MAGNESIUM: 4 MMOL/L (ref 3.5–5)
RBC # BLD: 3.67 M/UL (ref 4.2–5.4)
SODIUM BLD-SCNC: 139 MMOL/L (ref 136–145)
URINE CULTURE, ROUTINE: ABNORMAL
VITAMIN B-12: <150 PG/ML (ref 211–946)
WBC # BLD: 9.8 K/UL (ref 4.8–10.8)

## 2020-10-08 PROCEDURE — 82330 ASSAY OF CALCIUM: CPT

## 2020-10-08 PROCEDURE — 36415 COLL VENOUS BLD VENIPUNCTURE: CPT

## 2020-10-08 PROCEDURE — 6370000000 HC RX 637 (ALT 250 FOR IP): Performed by: STUDENT IN AN ORGANIZED HEALTH CARE EDUCATION/TRAINING PROGRAM

## 2020-10-08 PROCEDURE — 6360000002 HC RX W HCPCS: Performed by: INTERNAL MEDICINE

## 2020-10-08 PROCEDURE — 80048 BASIC METABOLIC PNL TOTAL CA: CPT

## 2020-10-08 PROCEDURE — 92526 ORAL FUNCTION THERAPY: CPT

## 2020-10-08 PROCEDURE — 6360000002 HC RX W HCPCS: Performed by: STUDENT IN AN ORGANIZED HEALTH CARE EDUCATION/TRAINING PROGRAM

## 2020-10-08 PROCEDURE — 6370000000 HC RX 637 (ALT 250 FOR IP): Performed by: INTERNAL MEDICINE

## 2020-10-08 PROCEDURE — 6370000000 HC RX 637 (ALT 250 FOR IP): Performed by: FAMILY MEDICINE

## 2020-10-08 PROCEDURE — 1210000000 HC MED SURG R&B

## 2020-10-08 PROCEDURE — 2580000003 HC RX 258: Performed by: STUDENT IN AN ORGANIZED HEALTH CARE EDUCATION/TRAINING PROGRAM

## 2020-10-08 PROCEDURE — 82607 VITAMIN B-12: CPT

## 2020-10-08 PROCEDURE — 82947 ASSAY GLUCOSE BLOOD QUANT: CPT

## 2020-10-08 PROCEDURE — 85025 COMPLETE CBC W/AUTO DIFF WBC: CPT

## 2020-10-08 PROCEDURE — 99221 1ST HOSP IP/OBS SF/LOW 40: CPT | Performed by: PSYCHIATRY & NEUROLOGY

## 2020-10-08 RX ADMIN — SODIUM CHLORIDE, PRESERVATIVE FREE 10 ML: 5 INJECTION INTRAVENOUS at 20:41

## 2020-10-08 RX ADMIN — CARVEDILOL 12.5 MG: 12.5 TABLET, FILM COATED ORAL at 18:43

## 2020-10-08 RX ADMIN — LOSARTAN POTASSIUM 25 MG: 25 TABLET, FILM COATED ORAL at 09:43

## 2020-10-08 RX ADMIN — ENOXAPARIN SODIUM 40 MG: 40 INJECTION SUBCUTANEOUS at 09:43

## 2020-10-08 RX ADMIN — INSULIN LISPRO 1 UNITS: 100 INJECTION, SOLUTION INTRAVENOUS; SUBCUTANEOUS at 22:15

## 2020-10-08 RX ADMIN — Medication 10 UNITS: at 22:14

## 2020-10-08 RX ADMIN — CALCITONIN SALMON 200 UNITS: 200 INJECTION, SOLUTION INTRAMUSCULAR; SUBCUTANEOUS at 09:43

## 2020-10-08 RX ADMIN — SODIUM CHLORIDE, PRESERVATIVE FREE 10 ML: 5 INJECTION INTRAVENOUS at 09:44

## 2020-10-08 RX ADMIN — CEFTRIAXONE 1 G: 1 INJECTION, POWDER, FOR SOLUTION INTRAMUSCULAR; INTRAVENOUS at 20:41

## 2020-10-08 RX ADMIN — ROSUVASTATIN CALCIUM 20 MG: 20 TABLET, FILM COATED ORAL at 09:42

## 2020-10-08 RX ADMIN — CARVEDILOL 12.5 MG: 12.5 TABLET, FILM COATED ORAL at 09:43

## 2020-10-08 ASSESSMENT — PAIN SCALES - GENERAL
PAINLEVEL_OUTOF10: 0
PAINLEVEL_OUTOF10: 0

## 2020-10-08 NOTE — PROGRESS NOTES
present.)  Follows Directions: Simple   Patient Positioning: Upright in bed  Consistencies Administered: Regular solid; Thin - cup      Monitored patient's swallowing function with the following observations noted:     Oral Phase  Oral Phase - Comment: Patient exhibited adequate oral prep and transit of regular solid consistency presentations, administered independently, with timing of oral transit primarily measuring 1-2 seconds in length and with min oral cavity residue noted post swallows; residue cleared from the mouth with additional dry swallows. Oral transit of thin liquid presentations, administered independently via cup, primarily measured 1-2 seconds in length. Pharyngeal Phase  Decreased Laryngeal Elevation: (Patient exhibited sluggish, inconsistently mildly decreased laryngeal elevation for swallow airway protection.)  Pharyngeal Phase - Comment: No outward S/S penetration/aspiration was noted with any regular solid consistency presentation or thin liquid presentation administered during treatment session this date. At this time, would recommend continuation regular solid consistency and thin liquids. Self-feed. Will continue to follow.      Electronically signed by ALEX Bush on 10/8/2020 at 11:21 AM

## 2020-10-08 NOTE — CONSULTS
[facility]  Psychiatry Consult    Reason for Consult: AMS, pressured speech, delusions    64-year-old white female history of depression, HTN and diabetes, admitted for AMS and treated for UTI, sepsis and hypercalcemia. A missing person report was allegedly filed on patient, and she was found by the police. There is no known history of dementia, however, patient's son reported that patient has been having memory issues. Patient lives with an elderly roommate. Confusion is resolving, however, patient was reportedly paranoid this morning. There was also concern about pressured speech. Most recent labs significant for anemia and mild hyperglycemia. PTH elevated and consistent with primary hyperparathyroidism. CT head showed some atrophy but no acute changes. No agitation reported by the medical staff. Patient was worried about her purse this morning. Patient is observed eating lunch this afternoon. Sitter is at bedside. Patient is calm and cooperative. Hard of hearing. Speech is nonpressured. Thought process is overall organized. She is talkative and pleasant. Appears to be in good spirits. Believes she is in a rehab but then states \"maybe I am still in the hospital, I'm here because I fell\"  Oriented to month and year. Knows the president. No paranoia elicited. She denies auditory and visual hallucinations. Denies mood swings and racing thoughts. Denies suicidal ideation. States she lives with a male friend. She denies depression at this time, however, claims at home at times she gets sad and cries. She saw a therapist in the past after her son  in a car accident years ago. Also saw a counselor after her divorce. States her  came out as douglas and left her for another man. Patient states she is hoping to go home soon.     Psychiatric History:    Diagnoses: depression  Suicide attempts/gestures: Denies   Prior hospitalizations: Denies   Medication trials: Denies   Mental health contact: Lost to follow-up   Head trauma: Denies    Substance Use History:  Denies alcohol and illicit drug use. No tobacco use. Allergies:  Pcn [penicillins]    Medical History:  Past Medical History:   Diagnosis Date    Diabetes ([address]     Hypertension        Family History:  History reviewed. No pertinent family history. Social History:  Patient is . Lives with a roommate. She has 2 living sons, one son  in a car accident. Review of Systems: 14 point review of systems negative except as described above    Vitals:    10/08/20 1245   BP: 121/60   Pulse: 85   Resp: 18   Temp: 96.4 °F (35.8 °C)   SpO2: 100%       Mental Status  Appearance: Appropriately groomed and in hospital attire. Made good eye contact. Behavior: Calm, cooperative. Smiling. Hard of hearing. No psychomotor retardation/agitation appreciated. Speech: Normal in tone, volume, and quality. No slurring, dysarthria or pressured speech noted. Mood: \"Better\"   Affect: Mood congruent. Thought Process: Appears linear, logical and goal oriented. Causality appears intact. Thought Content: Denies active suicidal and homicidal ideation. No overt delusions or paranoia appreciated. Perceptions: Denies auditory or visual hallucinations at present time. Not responding to internal stimuli. Concentration: Intact. Orientation: to person, year, month  Language: Intact. Fund of information: Intact. Memory: Recent impaired and remote appear intact. Impulsivity: Limited. Neurovegitative: Fair appetite and sleep. Insight: Improving. Judgment: Improving. Assessment  DSM-5 DIAGNOSIS:  Impression    Encephalopathy, resolving  Depression unspecified  Rule out dementia    Medical issues  Sepsis secondary to Klebsiella UTI   Hypercalcemia secondary to hyperparathyroidism  Diabetes mellitus    No evidence of acute suicidality, homicidality or psychosis observed today.   Encephalopathy in the setting of infection and hypercalcemia is resolving. Cannot rule out dementia given collateral information - she would benefit from outpatient work up. Patient is currently not meeting the criteria for inpatient psychiatric treatment. There is no need to continue one-to-one from psychiatry perspective unless primary team  have concerns. Recommend checking vitamin B12 level. She needs follow up with Endocrinology. SNF placement pending. We will sign off at this time. Thank you for consulting our service. Please call us with questions.       Dinora Chawla MD

## 2020-10-08 NOTE — PROGRESS NOTES
Hospitalist Progress Note  10/8/2020 8:14 AM  Subjective:   Admit Date: 10/5/2020  PCP: No primary care provider on file. Chief Complaint: altered mental status    Subjective: Continues to be confused with very pressured speech / paranoia. Does not remember meeting me yesterday or why she is in the hospital.  Today very hard to distract- focused on fact that a group of people came into her hospital room yesterday and were trying to sell her real estate. Then very focused on her missing purse. Seems to think she was at another hospital before this and someone locked up it there. [facility] History:   10-5: Brought to ED for evaluation after the police found her wandering. Active missing person report, had driven here from [place], [place] looking for her son (Who lives 4 hours away). No formal diagnosis of dementia but worsening confusion noted. Lives with a , with a nurse next door. Signs of UTI, placed on ceftriaxone and admitted to med/surg. 10-6: Son arrived with the plan of taking the patient back home but she is no less confused yet. Urine culture is showing unspecified growth, full results pending. Patient is trying to leave so will order a sitter to redirect her until she improves further. 10-7: awaiting urine culture data. Calcium remains high despite dose of Zometa and IVFs. Giving calcitonin. 10-8: Urine culture growing Klebsiella. Calcium level improving. ROS: Six point review of systems is negative except as specifically addressed above. DIET CARB CONTROL;     Intake/Output Summary (Last 24 hours) at 10/8/2020 0814  Last data filed at 10/7/2020 1432  Gross per 24 hour   Intake 240 ml   Output --   Net 240 ml     Medications:   dextrose      sodium chloride 125 mL/hr at 10/05/20 2037     Current Facility-Administered Medications   Medication Dose Route Frequency Provider Last Rate Last Dose    calcitonin (MIACALCIN) injection 200 Units  200 Units Intramuscular Daily Cali Jarrett MD   200 Units at 10/07/20 1214    carvedilol (COREG) tablet 12.5 mg  12.5 mg Oral BID  Cali Jarrett MD   12.5 mg at 10/07/20 1813    glucose (GLUTOSE) 40 % oral gel 15 g  15 g Oral PRN Saeid Saravia MD        dextrose 50 % IV solution  12.5 g Intravenous PRN Saeid Saravia MD        glucagon (rDNA) injection 1 mg  1 mg Intramuscular PRN Saeid Saravia MD        dextrose 5 % solution  100 mL/hr Intravenous PRN Saeid Saravia MD        insulin lispro (HUMALOG) injection vial 0-6 Units  0-6 Units Subcutaneous TID  Saeid Saravia MD   1 Units at 10/07/20 1811    insulin lispro (HUMALOG) injection vial 0-3 Units  0-3 Units Subcutaneous Nightly Saeid Saravia MD        cefTRIAXone (ROCEPHIN) 1 g in sterile water 10 mL IV syringe  1 g Intravenous Q24H Saeid Saravia MD   1 g at 10/07/20 2018    sodium chloride flush 0.9 % injection 10 mL  10 mL Intravenous 2 times per day Saeid Saravia MD   10 mL at 10/07/20 2018    sodium chloride flush 0.9 % injection 10 mL  10 mL Intravenous PRN Saeid Saravia MD        acetaminophen (TYLENOL) tablet 650 mg  650 mg Oral Q6H PRN Saeid Saravia MD   650 mg at 10/05/20 2204    Or    acetaminophen (TYLENOL) suppository 650 mg  650 mg Rectal Q6H PRN Saeid Saravia MD        polyethylene glycol (GLYCOLAX) packet 17 g  17 g Oral Daily PRN Saeid Saravia MD        promethazine (PHENERGAN) tablet 12.5 mg  12.5 mg Oral Q6H PRN Saeid Saravia MD        Or    ondansetron [facility]) injection 4 mg  4 mg Intravenous Q6H PRN Saeid Saravia MD        enoxaparin (LOVENOX) injection 40 mg  40 mg Subcutaneous Daily Saeid Saravia MD   40 mg at 10/07/20 0958    0.9 % sodium chloride infusion   Intravenous Continuous Saeid Saravia  mL/hr at 10/05/20 2037      potassium chloride (KLOR-CON M) extended release tablet 40 mEq  40 mEq Oral PRN Saeid Saravia MD        Or    potassium bicarb-citric acid (EFFER-K) effervescent tablet 40 mEq  40 mEq Oral PRN Amilcar Mckinney MD        Or   [facility] potassium chloride 10 mEq/100 mL IVPB (Peripheral Line)  10 mEq Intravenous PRN Amilcar Mckinney MD        magnesium sulfate 2 g in 50 mL IVPB premix  2 g Intravenous PRN Amilcar Mckinney MD        losartan (COZAAR) tablet 25 mg  25 mg Oral Daily [place]wood Olivera, DO   25 mg at 10/07/20 0111    rosuvastatin (CRESTOR) tablet 20 mg  20 mg Oral Daily [facility], DO   20 mg at 10/07/20 8176    insulin glargine (LANTUS) injection vial 10 Units  10 Units Subcutaneous Nightly [facility], DO   10 Units at 10/07/20 2018    melatonin tablet 5 mg  5 mg Oral Nightly PRN Amilcar Mckinney MD   5 mg at 10/07/20 2017        Labs:     Recent Labs     10/06/20  0212 10/07/20  0319 10/08/20  0349   WBC 7.5 12.5* 9.8   RBC 3.82* 3.91* 3.67*   HGB 11.4* 11.9* 11.0*   HCT 36.3* 36.8* 34.1*   MCV 95.0 94.1 92.9   MCH 29.8 30.4 30.0   MCHC 31.4* 32.3* 32.3*    268 233     Recent Labs     10/06/20  0212 10/07/20  0319 10/08/20  0349    140 139   K 4.4 3.8 4.0   ANIONGAP 9 13 11    109 109   CO2 19* 18* 19*   BUN 21 14 18   CREATININE 0.8 0.6 0.7   GLUCOSE 114* 146* 144*   CALCIUM 10.4* 10.4* 10.0     No results for input(s): MG, PHOS in the last 72 hours. No results for input(s): AST, ALT, ALB, BILITOT, ALKPHOS, ALB in the last 72 hours. Objective:   Vitals: /70   Pulse 87   Temp 96.7 °F (35.9 °C) (Temporal)   Resp 20   Ht 5' 7\" (1.702 m)   Wt 181 lb 4 oz (82.2 kg)   SpO2 97%   BMI 28.39 kg/m²   24HR INTAKE/OUTPUT:      Intake/Output Summary (Last 24 hours) at 10/8/2020 0814  Last data filed at 10/7/2020 1432  Gross per 24 hour   Intake 240 ml   Output --   Net 240 ml     General appearance: sitting up on side of bed. Very talkative.    Head: NC/AT   Eyes: normal sclera  Mouth: MMM   Lungs: no increased work of breathing, clear to auscultation bilaterally  Heart: tachycardia, regular rhythm  Abdomen: soft, non-tender; bowel sounds normal; no masses,  no organomegaly  Extremities: extremities normal, atraumatic, no cyanosis or edema  Neurologic: awake and alert but not fully oriented. Moving all extremities. Face/ tongue symmetric. Psych: pressured speech, flight of ideas, hard to distract but cooperative   Skin: no rashes    Assessment and Plan:   Principal Problem:    Sepsis with metabolic encephalopathy ([facility] [address]  Active Problems:    UTI (urinary tract infection)    Hypercalcemia    Diabetes mellitus ([facility] [address]  Resolved Problems:    * No resolved hospital problems. *    Altered mental status, 2/2 metabolic encephalopathy in setting of UTI and hypercalcemia, as well as likely underlying dementia  - Address potential contributing factors including UTI and hypercalcemia  -psychiatry consult given delusions, manic state, paranoia, possible delusions     Sepsis 2/2 Klebsiella UTI - both POA with tachycardia, leukocytosis, UA c/w infection   - Continue rocephin, sensitive to this (day 4/7). Could switch to oral abx on discharge    Hypercalcemia - PTH elevated, c/w primary hyperparathyroidism.   - Will need to be evaluated by endocrinology on discharge. - Level improved mildly initially with dose of Zometa / IVFs, but responded well to dose x2 of Calcitonin on 10/7. Will reduce IVF rate to 75 cc/hour. Sinus tachycardia - hyperCa and infection could be contributing. She is also excitable on exam. Increased dose of BB and tx above. Diabetes mellitus, with hyperglycemia  -POC glucose checks every 6 hours  -lantus and Sliding scale insulin as indicated    Advance Directive: Full Code    DVT prophylaxis: lovenox     Discharge planning: SNF. SW has made referrals. Asking psychiatry to see today. Signed:   Collette Nick, MD 10/8/2020 8:14 AM  Hospitalist

## 2020-10-09 LAB
ANION GAP SERPL CALCULATED.3IONS-SCNC: 11 MMOL/L (ref 7–19)
BASOPHILS ABSOLUTE: 0 K/UL (ref 0–0.2)
BASOPHILS RELATIVE PERCENT: 0.5 % (ref 0–1)
BUN BLDV-MCNC: 17 MG/DL (ref 8–23)
CALCIUM SERPL-MCNC: 10.1 MG/DL (ref 8.8–10.2)
CHLORIDE BLD-SCNC: 109 MMOL/L (ref 98–111)
CO2: 20 MMOL/L (ref 22–29)
CREAT SERPL-MCNC: 0.6 MG/DL (ref 0.5–0.9)
EOSINOPHILS ABSOLUTE: 0.2 K/UL (ref 0–0.6)
EOSINOPHILS RELATIVE PERCENT: 2.2 % (ref 0–5)
GFR AFRICAN AMERICAN: >59
GFR NON-AFRICAN AMERICAN: >60
GLUCOSE BLD-MCNC: 131 MG/DL (ref 74–109)
GLUCOSE BLD-MCNC: 135 MG/DL (ref 70–99)
GLUCOSE BLD-MCNC: 145 MG/DL (ref 70–99)
GLUCOSE BLD-MCNC: 160 MG/DL (ref 70–99)
GLUCOSE BLD-MCNC: 170 MG/DL (ref 70–99)
HCT VFR BLD CALC: 35.3 % (ref 37–47)
HEMOGLOBIN: 11.5 G/DL (ref 12–16)
IMMATURE GRANULOCYTES #: 0 K/UL
LYMPHOCYTES ABSOLUTE: 1.8 K/UL (ref 1.1–4.5)
LYMPHOCYTES RELATIVE PERCENT: 22.1 % (ref 20–40)
MCH RBC QN AUTO: 29.9 PG (ref 27–31)
MCHC RBC AUTO-ENTMCNC: 32.6 G/DL (ref 33–37)
MCV RBC AUTO: 91.9 FL (ref 81–99)
MONOCYTES ABSOLUTE: 0.7 K/UL (ref 0–0.9)
MONOCYTES RELATIVE PERCENT: 7.9 % (ref 0–10)
NEUTROPHILS ABSOLUTE: 5.5 K/UL (ref 1.5–7.5)
NEUTROPHILS RELATIVE PERCENT: 67.1 % (ref 50–65)
PDW BLD-RTO: 16 % (ref 11.5–14.5)
PERFORMED ON: ABNORMAL
PLATELET # BLD: 260 K/UL (ref 130–400)
PMV BLD AUTO: 10.3 FL (ref 9.4–12.3)
POTASSIUM REFLEX MAGNESIUM: 3.8 MMOL/L (ref 3.5–5)
RBC # BLD: 3.84 M/UL (ref 4.2–5.4)
SODIUM BLD-SCNC: 140 MMOL/L (ref 136–145)
WBC # BLD: 8.2 K/UL (ref 4.8–10.8)

## 2020-10-09 PROCEDURE — 6360000002 HC RX W HCPCS: Performed by: STUDENT IN AN ORGANIZED HEALTH CARE EDUCATION/TRAINING PROGRAM

## 2020-10-09 PROCEDURE — 6370000000 HC RX 637 (ALT 250 FOR IP): Performed by: INTERNAL MEDICINE

## 2020-10-09 PROCEDURE — 85025 COMPLETE CBC W/AUTO DIFF WBC: CPT

## 2020-10-09 PROCEDURE — 80048 BASIC METABOLIC PNL TOTAL CA: CPT

## 2020-10-09 PROCEDURE — 1210000000 HC MED SURG R&B

## 2020-10-09 PROCEDURE — 2580000003 HC RX 258: Performed by: STUDENT IN AN ORGANIZED HEALTH CARE EDUCATION/TRAINING PROGRAM

## 2020-10-09 PROCEDURE — 6370000000 HC RX 637 (ALT 250 FOR IP): Performed by: STUDENT IN AN ORGANIZED HEALTH CARE EDUCATION/TRAINING PROGRAM

## 2020-10-09 PROCEDURE — 82947 ASSAY GLUCOSE BLOOD QUANT: CPT

## 2020-10-09 PROCEDURE — 36415 COLL VENOUS BLD VENIPUNCTURE: CPT

## 2020-10-09 PROCEDURE — 6370000000 HC RX 637 (ALT 250 FOR IP): Performed by: FAMILY MEDICINE

## 2020-10-09 RX ADMIN — Medication 10 UNITS: at 20:43

## 2020-10-09 RX ADMIN — SODIUM CHLORIDE, PRESERVATIVE FREE 10 ML: 5 INJECTION INTRAVENOUS at 20:42

## 2020-10-09 RX ADMIN — INSULIN LISPRO 1 UNITS: 100 INJECTION, SOLUTION INTRAVENOUS; SUBCUTANEOUS at 12:41

## 2020-10-09 RX ADMIN — INSULIN LISPRO 1 UNITS: 100 INJECTION, SOLUTION INTRAVENOUS; SUBCUTANEOUS at 17:28

## 2020-10-09 RX ADMIN — Medication 5 MG: at 21:09

## 2020-10-09 RX ADMIN — SODIUM CHLORIDE, PRESERVATIVE FREE 10 ML: 5 INJECTION INTRAVENOUS at 10:10

## 2020-10-09 RX ADMIN — CARVEDILOL 12.5 MG: 12.5 TABLET, FILM COATED ORAL at 17:28

## 2020-10-09 RX ADMIN — CEFTRIAXONE 1 G: 1 INJECTION, POWDER, FOR SOLUTION INTRAMUSCULAR; INTRAVENOUS at 21:05

## 2020-10-09 RX ADMIN — CARVEDILOL 12.5 MG: 12.5 TABLET, FILM COATED ORAL at 10:11

## 2020-10-09 RX ADMIN — ENOXAPARIN SODIUM 40 MG: 40 INJECTION SUBCUTANEOUS at 10:10

## 2020-10-09 RX ADMIN — ACETAMINOPHEN 650 MG: 325 TABLET ORAL at 21:05

## 2020-10-09 RX ADMIN — LOSARTAN POTASSIUM 25 MG: 25 TABLET, FILM COATED ORAL at 10:11

## 2020-10-09 RX ADMIN — ROSUVASTATIN CALCIUM 20 MG: 20 TABLET, FILM COATED ORAL at 10:11

## 2020-10-09 ASSESSMENT — PAIN DESCRIPTION - LOCATION: LOCATION: HEAD

## 2020-10-09 ASSESSMENT — PAIN - FUNCTIONAL ASSESSMENT: PAIN_FUNCTIONAL_ASSESSMENT: ACTIVITIES ARE NOT PREVENTED

## 2020-10-09 ASSESSMENT — PAIN SCALES - GENERAL
PAINLEVEL_OUTOF10: 3
PAINLEVEL_OUTOF10: 0

## 2020-10-09 ASSESSMENT — PAIN DESCRIPTION - ORIENTATION: ORIENTATION: RIGHT;LEFT

## 2020-10-09 ASSESSMENT — PAIN DESCRIPTION - PAIN TYPE: TYPE: CHRONIC PAIN

## 2020-10-09 ASSESSMENT — PAIN DESCRIPTION - DIRECTION: RADIATING_TOWARDS: NO

## 2020-10-09 ASSESSMENT — PAIN DESCRIPTION - PROGRESSION: CLINICAL_PROGRESSION: GRADUALLY IMPROVING

## 2020-10-09 ASSESSMENT — PAIN DESCRIPTION - ONSET: ONSET: SUDDEN

## 2020-10-09 ASSESSMENT — PAIN DESCRIPTION - DESCRIPTORS: DESCRIPTORS: HEADACHE

## 2020-10-09 ASSESSMENT — PAIN DESCRIPTION - FREQUENCY: FREQUENCY: INTERMITTENT

## 2020-10-09 NOTE — PROGRESS NOTES
Hospitalist Progress Note  10/9/2020 7:50 AM  Subjective:   Admit Date: 10/5/2020  PCP: No primary care provider on file. Chief Complaint: altered mental status    Subjective:  Still seems confused at times but recent events but overall improved. Frustrated today that she can't make long distant call from her room. Discussed her reason for admission again, seems to have forgotten. Has insight into why UTI and hyperCa could cause confusion. Tells me she follows with endocrinologist as outpatient and was on \"little blue pill\" to reduce Ca levels, but ran out. Awaiting rehab. [facility] History:   10-5: Brought to ED for evaluation after the police found her wandering. Active missing person report, had driven here from [address] looking for her son (Who lives 4 hours away). No formal diagnosis of dementia but worsening confusion noted. Lives with a , with a nurse next door. Signs of UTI, placed on ceftriaxone and admitted to med/surg. 10-6: Son arrived with the plan of taking the patient back home but she is no less confused yet. Urine culture is showing unspecified growth, full results pending. Patient is trying to leave so will order a sitter to redirect her until she improves further. 10-7: awaiting urine culture data. Calcium remains high despite dose of Zometa and IVFs. Giving calcitonin. 10-8: Urine culture growing Klebsiella. Psych eval her, no concerns. Calcium level improving. ROS: Six point review of systems is negative except as specifically addressed above. DIET CARB CONTROL;     Intake/Output Summary (Last 24 hours) at 10/9/2020 0750  Last data filed at 10/8/2020 2332  Gross per 24 hour   Intake 400 ml   Output --   Net 400 ml     Medications:   dextrose      sodium chloride 75 mL/hr at 10/08/20 0944     Current Facility-Administered Medications   Medication Dose Route Frequency Provider Last Rate Last Dose    carvedilol (COREG) tablet 12.5 mg  12.5 mg Oral BID  Je Traylor MD   12.5 mg at 10/08/20 1843    glucose (GLUTOSE) 40 % oral gel 15 g  15 g Oral PRN Jann Sotomayor MD        dextrose 50 % IV solution  12.5 g Intravenous PRN Jann Sotomayor MD        glucagon (rDNA) injection 1 mg  1 mg Intramuscular PRN Jann Sotomayor MD        dextrose 5 % solution  100 mL/hr Intravenous PRN Jann Sotomayor MD        insulin lispro (HUMALOG) injection vial 0-6 Units  0-6 Units Subcutaneous TID  Jann Sotomayor MD   1 Units at 10/07/20 1811    insulin lispro (HUMALOG) injection vial 0-3 Units  0-3 Units Subcutaneous Nightly Jann Sotomayor MD   1 Units at 10/08/20 2215    cefTRIAXone (ROCEPHIN) 1 g in sterile water 10 mL IV syringe  1 g Intravenous Q24H Jann Sotomayor MD   1 g at 10/08/20 2041    sodium chloride flush 0.9 % injection 10 mL  10 mL Intravenous 2 times per day Jann Sotomayor MD   10 mL at 10/08/20 2041    sodium chloride flush 0.9 % injection 10 mL  10 mL Intravenous PRN Jann Sotomayor MD        acetaminophen (TYLENOL) tablet 650 mg  650 mg Oral Q6H PRN Jann Sotomayor MD   650 mg at 10/05/20 2204    Or    acetaminophen (TYLENOL) suppository 650 mg  650 mg Rectal Q6H PRN Jann Sotomayor MD        polyethylene glycol (GLYCOLAX) packet 17 g  17 g Oral Daily PRN Jann Sotomayor MD        promethazine (PHENERGAN) tablet 12.5 mg  12.5 mg Oral Q6H PRN Jann Sotomayor MD        Or    [facility]ron [facility]) injection 4 mg  4 mg Intravenous Q6H PRN Jann Sotomayor MD        enoxaparin (LOVENOX) injection 40 mg  40 mg Subcutaneous Daily Jann Sotomayor MD   40 mg at 10/08/20 0943    0.9 % sodium chloride infusion   Intravenous Continuous Je Traylor MD 75 mL/hr at 10/08/20 0944      potassium chloride (KLOR-CON M) extended release tablet 40 mEq  40 mEq Oral PRN Jann Sotomayor MD        Or    potassium bicarb-citric acid (EFFER-K) effervescent tablet 40 mEq  40 mEq Oral PRN Jann Sotomayor MD        Or    potassium chloride 10 mEq/100 mL IVPB (Peripheral Line)  10 mEq Intravenous PRN Jose Galindo MD        magnesium sulfate 2 g in 50 mL IVPB premix  2 g Intravenous PRN Jose Galindo MD        losartan (COZAAR) tablet 25 mg  25 mg Oral Daily [place] Olivera, DO   25 mg at 10/08/20 5436    rosuvastatin (CRESTOR) tablet 20 mg  20 mg Oral Daily [facility], DO   20 mg at 10/08/20 8930    insulin glargine (LANTUS) injection vial 10 Units  10 Units Subcutaneous Nightly [place] Olivera, DO   10 Units at 10/08/20 2214    melatonin tablet 5 mg  5 mg Oral Nightly PRN Jose Galindo MD   5 mg at 10/07/20 2017        Labs:     Recent Labs     10/07/20  0319 10/08/20  0349 10/09/20  0429   WBC 12.5* 9.8 8.2   RBC 3.91* 3.67* 3.84*   HGB 11.9* 11.0* 11.5*   HCT 36.8* 34.1* 35.3*   MCV 94.1 92.9 91.9   MCH 30.4 30.0 29.9   MCHC 32.3* 32.3* 32.6*    233 260     Recent Labs     10/07/20  0319 10/08/20  0349 10/09/20  0429    139 140   K 3.8 4.0 3.8   ANIONGAP 13 11 11    109 109   CO2 18* 19* 20*   BUN 14 18 17   CREATININE 0.6 0.7 0.6   GLUCOSE 146* 144* 131*   CALCIUM 10.4* 10.0 10.1     No results for input(s): MG, PHOS in the last 72 hours. No results for input(s): AST, ALT, ALB, BILITOT, ALKPHOS, ALB in the last 72 hours. Objective:   Vitals: /76   Pulse 93   Temp 97.3 °F (36.3 °C)   Resp 18   Ht 5' 7\" (1.702 m)   Wt 181 lb 4 oz (82.2 kg)   SpO2 99%   BMI 28.39 kg/m²   24HR INTAKE/OUTPUT:      Intake/Output Summary (Last 24 hours) at 10/9/2020 0750  Last data filed at 10/8/2020 2332  Gross per 24 hour   Intake 400 ml   Output --   Net 400 ml     General appearance: sitting up on side of bed. Very talkative.    Head: NC/AT   Eyes: normal sclera  Mouth: MMM   Lungs: no increased work of breathing, clear to auscultation bilaterally  Heart: tachycardia, regular rhythm  Abdomen: soft, non-tender; bowel sounds normal; no masses,  no organomegaly  Extremities: extremities normal, atraumatic, no cyanosis or edema  Neurologic: awake and alert but not fully oriented. Moving all extremities. Face/ tongue symmetric. Psych:  hard to distract but cooperative   Skin: no rashes    Assessment and Plan:   Principal Problem:    Sepsis with metabolic encephalopathy ([facility] [address]  Active Problems:    UTI (urinary tract infection)    Hypercalcemia    Diabetes mellitus ([facility] [address]    Encephalopathy acute    Depression  Resolved Problems:    * No resolved hospital problems. *    Altered mental status, 2/2 metabolic encephalopathy in setting of UTI and hypercalcemia, as well as likely underlying dementia  - improving. Suspect she is close to her baseline now. - Address potential contributing factors including UTI and hypercalcemia  -psychiatry consulted, no evidence of psychosis. Thought Encephalopathy in the setting of infection and hypercalcemia is resolving. Cannot rule out dementia given collateral information - she would benefit from outpatient work up     Sepsis 2/2 Klebsiella UTI - both POA with tachycardia, leukocytosis, UA c/w infection   - Continue rocephin, sensitive to this (day 5/7). Could switch to oral abx on discharge    Hypercalcemia - PTH elevated, c/w primary hyperparathyroidism.   - Will need to be evaluated by endocrinology on discharge. She tells me she already follows with one. - Level improved mildly initially with dose of Zometa / IVFs, but responded well to dose x2 of Calcitonin on 10/7. Will stop IVFs. Sinus tachycardia - hyperCa and infection could be contributing. She is also excitable on exam. Increased dose of BB and tx above. Diabetes mellitus, with hyperglycemia  -POC glucose checks every 6 hours  -lantus and Sliding scale insulin as indicated    Advance Directive: Full Code    DVT prophylaxis: lovenox     Discharge planning: SNF, precert pending. Medically ready. Signed:   Ivone Covarrubias MD 10/9/2020 7:50 AM  Hospitalist

## 2020-10-09 NOTE — PLAN OF CARE
Problem: Falls - Risk of:  Goal: Will remain free from falls  Description: Will remain free from falls  Outcome: Ongoing  Goal: Absence of physical injury  Description: Absence of physical injury  Outcome: Ongoing     Problem: Coping:  Goal: Ability to remain calm will improve  Description: Ability to remain calm will improve  Outcome: Ongoing     Problem: Safety:  Goal: Ability to remain free from injury will improve  Description: Ability to remain free from injury will improve  Outcome: Ongoing     Problem: Self-Care:  Goal: Ability to participate in self-care as condition permits will improve  Description: Ability to participate in self-care as condition permits will improve  Outcome: Ongoing     Problem: Nutrition  Goal: Optimal nutrition therapy  Outcome: Ongoing     Problem: Pain:  Goal: Pain level will decrease  Description: Pain level will decrease  Outcome: Ongoing  Goal: Control of acute pain  Description: Control of acute pain  Outcome: Ongoing  Goal: Control of chronic pain  Description: Control of chronic pain  Outcome: Ongoing

## 2020-10-10 LAB
ANION GAP SERPL CALCULATED.3IONS-SCNC: 15 MMOL/L (ref 7–19)
BASOPHILS ABSOLUTE: 0.1 K/UL (ref 0–0.2)
BASOPHILS RELATIVE PERCENT: 0.6 % (ref 0–1)
BUN BLDV-MCNC: 18 MG/DL (ref 8–23)
CALCIUM SERPL-MCNC: 10.5 MG/DL (ref 8.8–10.2)
CHLORIDE BLD-SCNC: 104 MMOL/L (ref 98–111)
CO2: 20 MMOL/L (ref 22–29)
CREAT SERPL-MCNC: 0.7 MG/DL (ref 0.5–0.9)
EOSINOPHILS ABSOLUTE: 0.1 K/UL (ref 0–0.6)
EOSINOPHILS RELATIVE PERCENT: 1.2 % (ref 0–5)
GFR AFRICAN AMERICAN: >59
GFR NON-AFRICAN AMERICAN: >60
GLUCOSE BLD-MCNC: 134 MG/DL (ref 70–99)
GLUCOSE BLD-MCNC: 151 MG/DL (ref 70–99)
GLUCOSE BLD-MCNC: 152 MG/DL (ref 74–109)
GLUCOSE BLD-MCNC: 183 MG/DL (ref 70–99)
GLUCOSE BLD-MCNC: 211 MG/DL (ref 70–99)
HCT VFR BLD CALC: 38.8 % (ref 37–47)
HEMOGLOBIN: 12.6 G/DL (ref 12–16)
IMMATURE GRANULOCYTES #: 0 K/UL
LYMPHOCYTES ABSOLUTE: 1.5 K/UL (ref 1.1–4.5)
LYMPHOCYTES RELATIVE PERCENT: 17.9 % (ref 20–40)
MCH RBC QN AUTO: 30.1 PG (ref 27–31)
MCHC RBC AUTO-ENTMCNC: 32.5 G/DL (ref 33–37)
MCV RBC AUTO: 92.8 FL (ref 81–99)
MONOCYTES ABSOLUTE: 0.6 K/UL (ref 0–0.9)
MONOCYTES RELATIVE PERCENT: 7.1 % (ref 0–10)
NEUTROPHILS ABSOLUTE: 6.3 K/UL (ref 1.5–7.5)
NEUTROPHILS RELATIVE PERCENT: 72.9 % (ref 50–65)
PDW BLD-RTO: 15.9 % (ref 11.5–14.5)
PERFORMED ON: ABNORMAL
PLATELET # BLD: 291 K/UL (ref 130–400)
PMV BLD AUTO: 10.2 FL (ref 9.4–12.3)
POTASSIUM REFLEX MAGNESIUM: 4.3 MMOL/L (ref 3.5–5)
RBC # BLD: 4.18 M/UL (ref 4.2–5.4)
SODIUM BLD-SCNC: 139 MMOL/L (ref 136–145)
WBC # BLD: 8.6 K/UL (ref 4.8–10.8)

## 2020-10-10 PROCEDURE — 97116 GAIT TRAINING THERAPY: CPT

## 2020-10-10 PROCEDURE — 2580000003 HC RX 258: Performed by: STUDENT IN AN ORGANIZED HEALTH CARE EDUCATION/TRAINING PROGRAM

## 2020-10-10 PROCEDURE — 6370000000 HC RX 637 (ALT 250 FOR IP): Performed by: INTERNAL MEDICINE

## 2020-10-10 PROCEDURE — 1210000000 HC MED SURG R&B

## 2020-10-10 PROCEDURE — 97161 PT EVAL LOW COMPLEX 20 MIN: CPT

## 2020-10-10 PROCEDURE — 6370000000 HC RX 637 (ALT 250 FOR IP): Performed by: STUDENT IN AN ORGANIZED HEALTH CARE EDUCATION/TRAINING PROGRAM

## 2020-10-10 PROCEDURE — 6370000000 HC RX 637 (ALT 250 FOR IP): Performed by: FAMILY MEDICINE

## 2020-10-10 PROCEDURE — 82947 ASSAY GLUCOSE BLOOD QUANT: CPT

## 2020-10-10 PROCEDURE — 36415 COLL VENOUS BLD VENIPUNCTURE: CPT

## 2020-10-10 PROCEDURE — 6360000002 HC RX W HCPCS: Performed by: STUDENT IN AN ORGANIZED HEALTH CARE EDUCATION/TRAINING PROGRAM

## 2020-10-10 PROCEDURE — 85025 COMPLETE CBC W/AUTO DIFF WBC: CPT

## 2020-10-10 PROCEDURE — 80048 BASIC METABOLIC PNL TOTAL CA: CPT

## 2020-10-10 RX ADMIN — SODIUM CHLORIDE, PRESERVATIVE FREE 10 ML: 5 INJECTION INTRAVENOUS at 21:20

## 2020-10-10 RX ADMIN — Medication 10 UNITS: at 21:20

## 2020-10-10 RX ADMIN — LOSARTAN POTASSIUM 25 MG: 25 TABLET, FILM COATED ORAL at 09:48

## 2020-10-10 RX ADMIN — CARVEDILOL 12.5 MG: 12.5 TABLET, FILM COATED ORAL at 09:48

## 2020-10-10 RX ADMIN — ENOXAPARIN SODIUM 40 MG: 40 INJECTION SUBCUTANEOUS at 09:49

## 2020-10-10 RX ADMIN — INSULIN LISPRO 1 UNITS: 100 INJECTION, SOLUTION INTRAVENOUS; SUBCUTANEOUS at 12:39

## 2020-10-10 RX ADMIN — CARVEDILOL 12.5 MG: 12.5 TABLET, FILM COATED ORAL at 17:40

## 2020-10-10 RX ADMIN — CEFTRIAXONE 1 G: 1 INJECTION, POWDER, FOR SOLUTION INTRAMUSCULAR; INTRAVENOUS at 21:48

## 2020-10-10 RX ADMIN — ROSUVASTATIN CALCIUM 20 MG: 20 TABLET, FILM COATED ORAL at 09:48

## 2020-10-10 RX ADMIN — INSULIN LISPRO 1 UNITS: 100 INJECTION, SOLUTION INTRAVENOUS; SUBCUTANEOUS at 21:20

## 2020-10-10 ASSESSMENT — PAIN SCALES - GENERAL
PAINLEVEL_OUTOF10: 0
PAINLEVEL_OUTOF10: 0

## 2020-10-10 NOTE — PLAN OF CARE
Problem: Falls - Risk of:  Goal: Will remain free from falls  Description: Will remain free from falls  10/10/2020 1020 by Ankur Mathur RN  Outcome: Ongoing  10/10/2020 0309 by Agus Browne LPN  Outcome: Ongoing  10/10/2020 0308 by Agus Browne LPN  Outcome: Ongoing  Goal: Absence of physical injury  Description: Absence of physical injury  10/10/2020 1020 by Ankur Mathur RN  Outcome: Ongoing  10/10/2020 0309 by Agus Browne LPN  Outcome: Ongoing  10/10/2020 0308 by Agus Browne LPN  Outcome: Ongoing

## 2020-10-10 NOTE — PROGRESS NOTES
Physical Therapy    Facility/Department: [facility] ONCOLOGY UNIT  Initial Assessment    NAME: Jen Grant  : 1945  MRN: [id number]    Date of Service: 10/10/2020    Discharge Recommendations:           Assessment   Body structures, Functions, Activity limitations: Decreased safe awareness;Decreased functional mobility   Assessment: Patient will benefit from continuing skilled physical therapy to improve mobility  Treatment Diagnosis: Unsteady gait  Prognosis: Good  Decision Making: Low Complexity  REQUIRES PT FOLLOW UP: Yes  Activity Tolerance  Activity Tolerance: Patient Tolerated treatment well       Patient Diagnosis(es): The primary encounter diagnosis was Delirium. Diagnoses of Acute cystitis without hematuria, Type 2 diabetes mellitus with hyperglycemia, without long-term current use of insulin ([address] and Hypercalcemia were also pertinent to this visit. has a past medical history of Diabetes ([address] and Hypertension. has a past surgical history that includes Coronary artery bypass graft; Hysterectomy; and Appendectomy. Restrictions     Vision/Hearing        Subjective  General  Chart Reviewed: Yes  Patient assessed for rehabilitation services?: Yes  Diagnosis: AMS  Follows Commands: Within Functional Limits  Subjective  Subjective: Agrees to work with therapy  Pain Screening  Patient Currently in Pain: No          Orientation  Orientation  Overall Orientation Status: Impaired  Orientation Level: Disoriented to place; Disoriented to time;Disoriented to situation  Social/Functional History     Cognition        Objective          PROM RLE (degrees)  RLE PROM: WFL  PROM LLE (degrees)  LLE PROM: WFL  Strength RLE  Strength RLE: WFL  Strength LLE  Strength LLE: WFL        Bed mobility  Supine to Sit: Modified independent  Sit to Supine: Modified independent  Scooting: Modified independent  Transfers  Sit to Stand: Supervision  Stand to sit: Supervision  Bed to Chair: Supervision  Ambulation  Ambulation?:

## 2020-10-10 NOTE — PLAN OF CARE
Problem: Falls - Risk of:  Goal: Will remain free from falls  Description: Will remain free from falls  10/10/2020 0308 by Agus Browne LPN  Outcome: Ongoing  10/9/2020 1624 by Ta Avila RN  Outcome: Met This Shift  Goal: Absence of physical injury  Description: Absence of physical injury  10/10/2020 0308 by Agus Browne LPN  Outcome: Ongoing  10/9/2020 1624 by Ta Avila RN  Outcome: Met This Shift     Problem: Coping:  Goal: Ability to remain calm will improve  Description: Ability to remain calm will improve  10/10/2020 0308 by Agus Browne LPN  Outcome: Ongoing  10/9/2020 1624 by Ta Avila RN  Outcome: Met This Shift     Problem: Safety:  Goal: Ability to remain free from injury will improve  Description: Ability to remain free from injury will improve  10/10/2020 0308 by Agus Browne LPN  Outcome: Ongoing  10/9/2020 1624 by Ta Avila RN  Outcome: Met This Shift     Problem: Self-Care:  Goal: Ability to participate in self-care as condition permits will improve  Description: Ability to participate in self-care as condition permits will improve  10/10/2020 0308 by Agus Browne LPN  Outcome: Ongoing  10/9/2020 1624 by Ta Avila RN  Outcome: Met This Shift     Problem: Nutrition  Goal: Optimal nutrition therapy  10/10/2020 0308 by Agus Browne LPN  Outcome: Ongoing  10/9/2020 1624 by Ta Avila RN  Outcome: Met This Shift     Problem: Pain:  Goal: Pain level will decrease  Description: Pain level will decrease  10/10/2020 0308 by Agus Browne LPN  Outcome: Ongoing  10/9/2020 1624 by Ta Avila RN  Outcome: Met This Shift  Goal: Control of acute pain  Description: Control of acute pain  10/10/2020 0308 by Agus Browne LPN  Outcome: Ongoing  10/9/2020 1624 by Ta Avila RN  Outcome: Met This Shift  Goal: Control of chronic pain  Description: Control of chronic pain  10/10/2020 0308 by Corrie Knowles Johan Granger LPN  Outcome: Ongoing  10/9/2020 1624 by Cristina Up RN  Outcome: Met This Shift     Problem: Mental Status - Impaired:  Goal: Mental status will improve  Description: Mental status will improve  10/10/2020 0308 by Lea Benjamin LPN  Outcome: Ongoing  10/9/2020 1626 by Cristina Up RN  Outcome: Ongoing

## 2020-10-10 NOTE — PLAN OF CARE
Problem: Falls - Risk of:  Goal: Will remain free from falls  Description: Will remain free from falls  10/10/2020 0309 by Derrick Lester LPN  Outcome: Ongoing  10/10/2020 0308 by Derrick Lester LPN  Outcome: Ongoing  10/9/2020 1624 by Jose Bermeo RN  Outcome: Met This Shift  Goal: Absence of physical injury  Description: Absence of physical injury  10/10/2020 0309 by Derrick Lester LPN  Outcome: Ongoing  10/10/2020 0308 by Derrick Lester LPN  Outcome: Ongoing  10/9/2020 1624 by Jose Bermeo RN  Outcome: Met This Shift     Problem: Coping:  Goal: Ability to remain calm will improve  Description: Ability to remain calm will improve  10/10/2020 0309 by Derrick Lester LPN  Outcome: Ongoing  10/10/2020 0308 by Derrick Lester LPN  Outcome: Ongoing  10/9/2020 1624 by Jose Bermeo RN  Outcome: Met This Shift     Problem: Safety:  Goal: Ability to remain free from injury will improve  Description: Ability to remain free from injury will improve  10/10/2020 0309 by Derrick Lester LPN  Outcome: Ongoing  10/10/2020 0308 by Derrick Lester LPN  Outcome: Ongoing  10/9/2020 1624 by Jose Bermeo RN  Outcome: Met This Shift     Problem: Self-Care:  Goal: Ability to participate in self-care as condition permits will improve  Description: Ability to participate in self-care as condition permits will improve  10/10/2020 0309 by Derrick Lester LPN  Outcome: Ongoing  10/10/2020 0308 by Derrick Lester LPN  Outcome: Ongoing  10/9/2020 1624 by Jose Bermeo RN  Outcome: Met This Shift     Problem: Nutrition  Goal: Optimal nutrition therapy  10/10/2020 0309 by Derrick Lester LPN  Outcome: Ongoing  10/10/2020 0308 by Derrick Lester LPN  Outcome: Ongoing  10/9/2020 1624 by Jose Bermeo RN  Outcome: Met This Shift     Problem: Pain:  Goal: Pain level will decrease  Description: Pain level will decrease  10/10/2020 0309 by Derrick Lester LPN  Outcome: Ongoing  10/10/2020 0308 by Keira Urrutia LPN  Outcome: Ongoing  10/9/2020 1624 by Efra Tineo RN  Outcome: Met This Shift  Goal: Control of acute pain  Description: Control of acute pain  10/10/2020 0309 by Keira Urrutia LPN  Outcome: Ongoing  10/10/2020 0308 by Keira Urrutia LPN  Outcome: Ongoing  10/9/2020 1624 by Efra Tineo RN  Outcome: Met This Shift  Goal: Control of chronic pain  Description: Control of chronic pain  10/10/2020 0309 by Keira Urrutai LPN  Outcome: Ongoing  10/10/2020 0308 by Keira Urrutia LPN  Outcome: Ongoing  10/9/2020 1624 by Efra Tineo RN  Outcome: Met This Shift     Problem: Mental Status - Impaired:  Goal: Mental status will improve  Description: Mental status will improve  10/10/2020 0309 by Keira Urrutia LPN  Outcome: Ongoing  10/10/2020 0308 by Keira Urrutia LPN  Outcome: Ongoing  10/9/2020 1626 by Efra Tineo RN  Outcome: Ongoing     Problem: Sensory:  Goal: General experience of comfort will improve  Description: General experience of comfort will improve  Outcome: Ongoing     Problem: Urinary Elimination:  Goal: Signs and symptoms of infection will decrease  Description: Signs and symptoms of infection will decrease  Outcome: Ongoing  Goal: Ability to reestablish a normal urinary elimination pattern will improve - after catheter removal  Description: Ability to reestablish a normal urinary elimination pattern will improve  Outcome: Ongoing  Goal: Complications related to the disease process, condition or treatment will be avoided or minimized  Description: Complications related to the disease process, condition or treatment will be avoided or minimized  Outcome: Ongoing

## 2020-10-10 NOTE — PROGRESS NOTES
12 hour chart check review completed. Electronically signed by Pati Gaspar LPN on 62/41/1288 at 2:03 AM

## 2020-10-10 NOTE — PROGRESS NOTES
Hospitalist Progress Note  10/10/2020 7:56 AM  Subjective:   Admit Date: 10/5/2020  PCP: No primary care provider on file. Chief Complaint: altered mental status    Subjective:  Has finished reading her [employer] and wants to know where she can find another. Then asked me where her car was parked. Reminded her that her son came to get it. She does not remember why she is here. I reminded her about hyperCa and UTI. Then asked, \"I traveled all over the [place] to come here and you aren't even going to do surgery on me? ?\"    [facility] History:   10-5: Brought to ED for evaluation after the police found her wandering. Active missing person report, had driven here from [address] looking for her son (Who lives 4 hours away). No formal diagnosis of dementia but worsening confusion noted. Lives with a , with a nurse next door. Signs of UTI, placed on ceftriaxone and admitted to med/surg. 10-6: Son arrived with the plan of taking the patient back home but she is no less confused yet. Urine culture is showing unspecified growth, full results pending. Patient is trying to leave so will order a sitter to redirect her until she improves further. 10-7: awaiting urine culture data. Calcium remains high despite dose of Zometa and IVFs. Giving calcitonin. 10-8: Urine culture growing Klebsiella. Psych eval her, no concerns. Calcium level improving. 10-9: no acute issues. Awaiting precert to SNF    ROS: Six point review of systems is negative except as specifically addressed above. DIET CARB CONTROL;     Intake/Output Summary (Last 24 hours) at 10/10/2020 0756  Last data filed at 10/10/2020 0108  Gross per 24 hour   Intake 750 ml   Output --   Net 750 ml     Medications:   dextrose      sodium chloride 75 mL/hr at 10/08/20 0944     Current Facility-Administered Medications   Medication Dose Route Frequency Provider Last Rate Last Dose    carvedilol (COREG) tablet 12.5 mg  12.5 mg Oral BID  Naif Puentes MD   12.5 mg at 10/09/20 1728    glucose (GLUTOSE) 40 % oral gel 15 g  15 g Oral PRN Brianna Frye MD        dextrose 50 % IV solution  12.5 g Intravenous PRN Brianna Frye MD        glucagon (rDNA) injection 1 mg  1 mg Intramuscular PRN Brianna Frye MD        dextrose 5 % solution  100 mL/hr Intravenous PRN Brianna Frye MD        insulin lispro (HUMALOG) injection vial 0-6 Units  0-6 Units Subcutaneous TID WC Brianna Frye MD   1 Units at 10/09/20 1728    insulin lispro (HUMALOG) injection vial 0-3 Units  0-3 Units Subcutaneous Nightly Brianna Frye MD   1 Units at 10/08/20 2215    cefTRIAXone (ROCEPHIN) 1 g in sterile water 10 mL IV syringe  1 g Intravenous Q24H Brianna Frye MD   1 g at 10/09/20 2105    sodium chloride flush 0.9 % injection 10 mL  10 mL Intravenous 2 times per day Brianna Frye MD   10 mL at 10/09/20 2042    sodium chloride flush 0.9 % injection 10 mL  10 mL Intravenous PRN Brianna Frye MD        acetaminophen (TYLENOL) tablet 650 mg  650 mg Oral Q6H PRN Brianna Frye MD   650 mg at 10/09/20 2105    Or    acetaminophen (TYLENOL) suppository 650 mg  650 mg Rectal Q6H PRN Brianna Frye MD        polyethylene glycol (GLYCOLAX) packet 17 g  17 g Oral Daily PRN Brianna Frye MD        promethazine (PHENERGAN) tablet 12.5 mg  12.5 mg Oral Q6H PRN Brianna Frye MD        Or    ondansetron [facility]) injection 4 mg  4 mg Intravenous Q6H PRN Brianna Frye MD        enoxaparin (LOVENOX) injection 40 mg  40 mg Subcutaneous Daily Brianna Frye MD   40 mg at 10/09/20 1010    0.9 % sodium chloride infusion   Intravenous Continuous Naif Puentes MD 75 mL/hr at 10/08/20 0944      potassium chloride (KLOR-CON M) extended release tablet 40 mEq  40 mEq Oral PRN Brianna Frey MD        Or    potassium bicarb-citric acid (EFFER-K) effervescent tablet 40 mEq  40 mEq Oral PRN Brianna Frye MD        Or    potassium chloride 10 mEq/100 mL IVPB (Peripheral Line)  10 mEq Intravenous PRN Adan Rosario MD        magnesium sulfate 2 g in 50 mL IVPB premix  2 g Intravenous PRN Adan Rosario MD        losartan (COZAAR) tablet 25 mg  25 mg Oral Daily Venkata Olivera, DO   25 mg at 10/09/20 1011    rosuvastatin (CRESTOR) tablet 20 mg  20 mg Oral Daily Venkata Olivera, DO   20 mg at 10/09/20 1011    insulin glargine (LANTUS) injection vial 10 Units  10 Units Subcutaneous Nightly Venkata Olivera, DO   10 Units at 10/09/20 2043    melatonin tablet 5 mg  5 mg Oral Nightly PRN Adan Rosario MD   5 mg at 10/09/20 2109        Labs:     Recent Labs     10/08/20  0349 10/09/20  0429   WBC 9.8 8.2   RBC 3.67* 3.84*   HGB 11.0* 11.5*   HCT 34.1* 35.3*   MCV 92.9 91.9   MCH 30.0 29.9   MCHC 32.3* 32.6*    260     Recent Labs     10/08/20  0349 10/09/20  0429    140   K 4.0 3.8   ANIONGAP 11 11    109   CO2 19* 20*   BUN 18 17   CREATININE 0.7 0.6   GLUCOSE 144* 131*   CALCIUM 10.0 10.1     No results for input(s): MG, PHOS in the last 72 hours. No results for input(s): AST, ALT, ALB, BILITOT, ALKPHOS, ALB in the last 72 hours. Objective:   Vitals: /74   Pulse 81   Temp 96.7 °F (35.9 °C)   Resp 18   Ht 5' 7\" (1.702 m)   Wt 181 lb 4 oz (82.2 kg)   SpO2 95%   BMI 28.39 kg/m²   24HR INTAKE/OUTPUT:      Intake/Output Summary (Last 24 hours) at 10/10/2020 0756  Last data filed at 10/10/2020 0108  Gross per 24 hour   Intake 750 ml   Output --   Net 750 ml     General appearance: sitting up on side of bed. Very talkative. Head: NC/AT   Eyes: normal sclera  Mouth: MMM   Lungs: no increased work of breathing, clear to auscultation bilaterally  Heart: tachycardia, regular rhythm  Abdomen: soft, non-tender; bowel sounds normal; no masses,  no organomegaly  Extremities: extremities normal, atraumatic, no cyanosis or edema  Neurologic: awake and alert but not fully oriented. Moving all extremities. Face/ tongue symmetric.    Psych:  hard to

## 2020-10-11 LAB
ANION GAP SERPL CALCULATED.3IONS-SCNC: 12 MMOL/L (ref 7–19)
BASOPHILS ABSOLUTE: 0 K/UL (ref 0–0.2)
BASOPHILS RELATIVE PERCENT: 0.5 % (ref 0–1)
BUN BLDV-MCNC: 19 MG/DL (ref 8–23)
CALCIUM SERPL-MCNC: 9.9 MG/DL (ref 8.8–10.2)
CHLORIDE BLD-SCNC: 107 MMOL/L (ref 98–111)
CO2: 20 MMOL/L (ref 22–29)
CREAT SERPL-MCNC: 0.7 MG/DL (ref 0.5–0.9)
EOSINOPHILS ABSOLUTE: 0.1 K/UL (ref 0–0.6)
EOSINOPHILS RELATIVE PERCENT: 1.6 % (ref 0–5)
GFR AFRICAN AMERICAN: >59
GFR NON-AFRICAN AMERICAN: >60
GLUCOSE BLD-MCNC: 119 MG/DL (ref 70–99)
GLUCOSE BLD-MCNC: 128 MG/DL (ref 74–109)
GLUCOSE BLD-MCNC: 157 MG/DL (ref 70–99)
GLUCOSE BLD-MCNC: 177 MG/DL (ref 70–99)
GLUCOSE BLD-MCNC: 211 MG/DL (ref 70–99)
HCT VFR BLD CALC: 35.4 % (ref 37–47)
HEMOGLOBIN: 11.5 G/DL (ref 12–16)
IMMATURE GRANULOCYTES #: 0 K/UL
LYMPHOCYTES ABSOLUTE: 1.6 K/UL (ref 1.1–4.5)
LYMPHOCYTES RELATIVE PERCENT: 19.5 % (ref 20–40)
MCH RBC QN AUTO: 29.9 PG (ref 27–31)
MCHC RBC AUTO-ENTMCNC: 32.5 G/DL (ref 33–37)
MCV RBC AUTO: 92.2 FL (ref 81–99)
MONOCYTES ABSOLUTE: 0.8 K/UL (ref 0–0.9)
MONOCYTES RELATIVE PERCENT: 9.6 % (ref 0–10)
NEUTROPHILS ABSOLUTE: 5.6 K/UL (ref 1.5–7.5)
NEUTROPHILS RELATIVE PERCENT: 68.7 % (ref 50–65)
PDW BLD-RTO: 15.9 % (ref 11.5–14.5)
PERFORMED ON: ABNORMAL
PLATELET # BLD: 270 K/UL (ref 130–400)
PMV BLD AUTO: 10.2 FL (ref 9.4–12.3)
POTASSIUM REFLEX MAGNESIUM: 3.6 MMOL/L (ref 3.5–5)
PTH RELATED PEPTIDE: <2 PMOL/L (ref 0–3.4)
RBC # BLD: 3.84 M/UL (ref 4.2–5.4)
SODIUM BLD-SCNC: 139 MMOL/L (ref 136–145)
WBC # BLD: 8.1 K/UL (ref 4.8–10.8)

## 2020-10-11 PROCEDURE — 85025 COMPLETE CBC W/AUTO DIFF WBC: CPT

## 2020-10-11 PROCEDURE — 80048 BASIC METABOLIC PNL TOTAL CA: CPT

## 2020-10-11 PROCEDURE — 6370000000 HC RX 637 (ALT 250 FOR IP): Performed by: STUDENT IN AN ORGANIZED HEALTH CARE EDUCATION/TRAINING PROGRAM

## 2020-10-11 PROCEDURE — 1210000000 HC MED SURG R&B

## 2020-10-11 PROCEDURE — 82947 ASSAY GLUCOSE BLOOD QUANT: CPT

## 2020-10-11 PROCEDURE — 6370000000 HC RX 637 (ALT 250 FOR IP): Performed by: FAMILY MEDICINE

## 2020-10-11 PROCEDURE — 6360000002 HC RX W HCPCS: Performed by: STUDENT IN AN ORGANIZED HEALTH CARE EDUCATION/TRAINING PROGRAM

## 2020-10-11 PROCEDURE — 36415 COLL VENOUS BLD VENIPUNCTURE: CPT

## 2020-10-11 PROCEDURE — 2580000003 HC RX 258: Performed by: STUDENT IN AN ORGANIZED HEALTH CARE EDUCATION/TRAINING PROGRAM

## 2020-10-11 PROCEDURE — 97116 GAIT TRAINING THERAPY: CPT

## 2020-10-11 PROCEDURE — 6370000000 HC RX 637 (ALT 250 FOR IP): Performed by: INTERNAL MEDICINE

## 2020-10-11 RX ADMIN — CARVEDILOL 12.5 MG: 12.5 TABLET, FILM COATED ORAL at 17:52

## 2020-10-11 RX ADMIN — Medication 5 MG: at 21:40

## 2020-10-11 RX ADMIN — CEFTRIAXONE 1 G: 1 INJECTION, POWDER, FOR SOLUTION INTRAMUSCULAR; INTRAVENOUS at 21:22

## 2020-10-11 RX ADMIN — SODIUM CHLORIDE, PRESERVATIVE FREE 10 ML: 5 INJECTION INTRAVENOUS at 21:40

## 2020-10-11 RX ADMIN — INSULIN LISPRO 1 UNITS: 100 INJECTION, SOLUTION INTRAVENOUS; SUBCUTANEOUS at 13:07

## 2020-10-11 RX ADMIN — LOSARTAN POTASSIUM 25 MG: 25 TABLET, FILM COATED ORAL at 09:04

## 2020-10-11 RX ADMIN — Medication 10 UNITS: at 21:40

## 2020-10-11 RX ADMIN — SODIUM CHLORIDE, PRESERVATIVE FREE 10 ML: 5 INJECTION INTRAVENOUS at 09:04

## 2020-10-11 RX ADMIN — INSULIN LISPRO 1 UNITS: 100 INJECTION, SOLUTION INTRAVENOUS; SUBCUTANEOUS at 21:40

## 2020-10-11 RX ADMIN — ENOXAPARIN SODIUM 40 MG: 40 INJECTION SUBCUTANEOUS at 09:03

## 2020-10-11 RX ADMIN — ROSUVASTATIN CALCIUM 20 MG: 20 TABLET, FILM COATED ORAL at 09:04

## 2020-10-11 RX ADMIN — CARVEDILOL 12.5 MG: 12.5 TABLET, FILM COATED ORAL at 09:04

## 2020-10-11 ASSESSMENT — PAIN SCALES - GENERAL: PAINLEVEL_OUTOF10: 0

## 2020-10-11 NOTE — PLAN OF CARE
Problem: Falls - Risk of:  Goal: Will remain free from falls  Description: Will remain free from falls  10/11/2020 1609 by Chapincito Le RN  Outcome: Ongoing  10/11/2020 0446 by James Ball LPN  Outcome: Ongoing  Goal: Absence of physical injury  Description: Absence of physical injury  10/11/2020 1609 by Chapincito Le RN  Outcome: Ongoing  10/11/2020 0446 by James Ball LPN  Outcome: Ongoing

## 2020-10-11 NOTE — PROGRESS NOTES
Hospitalist Progress Note  10/11/2020 8:10 AM  Subjective:   Admit Date: 10/5/2020  PCP: No primary care provider on file. Chief Complaint: altered mental status    Subjective: Sitting at the window. Doing fine today. Remembers me from yesterday. Cumulative Hospital History:   10-5: Brought to ED for evaluation after the police found her wandering. Active missing person report, had driven here from [address] looking for her son (Who lives 4 hours away). No formal diagnosis of dementia but worsening confusion noted. Lives with a , with a nurse next door. Signs of UTI, placed on ceftriaxone and admitted to med/surg. 10-6: Son arrived with the plan of taking the patient back home but she is no less confused yet. Urine culture is showing unspecified growth, full results pending. Patient is trying to leave so will order a sitter to redirect her until she improves further. 10-7: awaiting urine culture data. Calcium remains high despite dose of Zometa and IVFs. Giving calcitonin. 10-8: Urine culture growing Klebsiella. Psych eval her, no concerns. Calcium level improving. 10-9: no acute issues. Awaiting precert to SNF    ROS: Six point review of systems is negative except as specifically addressed above. DIET CARB CONTROL;     Intake/Output Summary (Last 24 hours) at 10/11/2020 0810  Last data filed at 10/11/2020 0029  Gross per 24 hour   Intake 550 ml   Output --   Net 550 ml     Medications:   dextrose      sodium chloride 75 mL/hr at 10/08/20 0944     Current Facility-Administered Medications   Medication Dose Route Frequency Provider Last Rate Last Dose    carvedilol (COREG) tablet 12.5 mg  12.5 mg Oral BID  Katie Richardson MD   12.5 mg at 10/10/20 1740    glucose (GLUTOSE) 40 % oral gel 15 g  15 g Oral PRN Xiomy Fleming MD        dextrose 50 % IV solution  12.5 g Intravenous PRN Xiomy Fleming MD        glucagon (rDNA) injection 1 mg  1 mg Intramuscular PRN Jelly Lovell rosuvastatin (CRESTOR) tablet 20 mg  20 mg Oral Daily Venkata Olivera, DO   20 mg at 10/10/20 0948    insulin glargine (LANTUS) injection vial 10 Units  10 Units Subcutaneous Nightly Venkata Olivera, DO   10 Units at 10/10/20 2120    melatonin tablet 5 mg  5 mg Oral Nightly PRN Gabbie Salazar MD   5 mg at 10/09/20 2109        Labs:     Recent Labs     10/09/20  0429 10/10/20  0728 10/11/20  0607   WBC 8.2 8.6 8.1   RBC 3.84* 4.18* 3.84*   HGB 11.5* 12.6 11.5*   HCT 35.3* 38.8 35.4*   MCV 91.9 92.8 92.2   MCH 29.9 30.1 29.9   MCHC 32.6* 32.5* 32.5*    291 270     Recent Labs     10/09/20  0429 10/10/20  0728 10/11/20  0607    139 139   K 3.8 4.3 3.6   ANIONGAP 11 15 12    104 107   CO2 20* 20* 20*   BUN 17 18 19   CREATININE 0.6 0.7 0.7   GLUCOSE 131* 152* 128*   CALCIUM 10.1 10.5* 9.9     No results for input(s): MG, PHOS in the last 72 hours. No results for input(s): AST, ALT, ALB, BILITOT, ALKPHOS, ALB in the last 72 hours. Objective:   Vitals: /76   Pulse 95   Temp 97.2 °F (36.2 °C) (Temporal)   Resp 16   Ht 5' 7\" (1.702 m)   Wt 181 lb 4 oz (82.2 kg)   SpO2 96%   BMI 28.39 kg/m²   24HR INTAKE/OUTPUT:      Intake/Output Summary (Last 24 hours) at 10/11/2020 0810  Last data filed at 10/11/2020 0029  Gross per 24 hour   Intake 550 ml   Output --   Net 550 ml     General appearance: sitting up on side of bed. Very talkative. Head: NC/AT   Eyes: normal sclera  Mouth: MMM   Lungs: no increased work of breathing, clear to auscultation bilaterally  Heart: tachycardia, regular rhythm  Abdomen: soft, non-tender; bowel sounds normal; no masses,  no organomegaly  Extremities: extremities normal, atraumatic, no cyanosis or edema  Neurologic: awake and alert but not fully oriented. Moving all extremities. Face/ tongue symmetric.    Psych:  hard to distract but cooperative   Skin: no rashes    Assessment and Plan:   Principal Problem:    Sepsis with metabolic encephalopathy ([facility] [address]  Active Problems: UTI (urinary tract infection)    Hypercalcemia    Diabetes mellitus ([facility] [address]    Encephalopathy acute    Depression  Resolved Problems:    * No resolved hospital problems. *    Altered mental status, 2/2 metabolic encephalopathy in setting of UTI and hypercalcemia, as well as likely underlying dementia  - improving. Suspect she is close to her baseline now. - Address potential contributing factors including UTI and hypercalcemia  -psychiatry consulted, no evidence of psychosis. Thought Encephalopathy in the setting of infection and hypercalcemia is resolving. Cannot rule out dementia given collateral information - she would benefit from outpatient work up     Sepsis 2/2 Klebsiella UTI - both POA with tachycardia, leukocytosis, UA c/w infection   - Continue rocephin, sensitive to this (day 7/7). Could switch to oral abx on discharge    Hypercalcemia - PTH elevated, c/w primary hyperparathyroidism.   - Will need to be evaluated by endocrinology on discharge. She tells me she already follows with one. - Level improved mildly initially with dose of Zometa / IVFs, but responded well to dose x2 of Calcitonin on 10/7. Sinus tachycardia - hyperCa and infection could be contributing. She is also excitable on exam. Increased dose of BB and tx above. Diabetes mellitus, with hyperglycemia  -POC glucose checks every 6 hours  -lantus and Sliding scale insulin as indicated    Advance Directive: Full Code    DVT prophylaxis: lovenox     Discharge planning: SNF, precert pending. Medically ready. Signed:   Cuba Mooney MD 10/11/2020 8:10 AM  Hospitalist

## 2020-10-11 NOTE — PROGRESS NOTES
Physical Therapy  Name: Duke Cortes  MRN:  [id number]  Date of service:  10/11/2020       10/11/20 4894   Subjective   Subjective Pt states she is confused as to why no one is helping her get out of here. Pt willling to walk with therapy. General Comment   Comments Pt noted to be up ad adrian in room and merritt. Ambulation   Ambulation? Yes   WB Status WBAT   Ambulation 1   Device No Device   Assistance Supervision   Distance 700'   Comments steady with amb. Short term goals   Time Frame for Short term goals 2 weeks   Short term goal 1 Ambulate 400 feet independently and safely   Conditions Requiring Skilled Therapeutic Intervention   Body structures, Functions, Activity limitations Decreased safe awareness;Decreased cognition   Assessment Pt is doing well with amb.     Activity Tolerance   Activity Tolerance Patient Tolerated treatment well       Electronically signed by Kory Jolly PTA on 10/11/2020 at 2:37 PM

## 2020-10-12 LAB
ANION GAP SERPL CALCULATED.3IONS-SCNC: 12 MMOL/L (ref 7–19)
BUN BLDV-MCNC: 17 MG/DL (ref 8–23)
CALCIUM SERPL-MCNC: 9.8 MG/DL (ref 8.8–10.2)
CHLORIDE BLD-SCNC: 107 MMOL/L (ref 98–111)
CO2: 19 MMOL/L (ref 22–29)
CREAT SERPL-MCNC: 0.8 MG/DL (ref 0.5–0.9)
GFR AFRICAN AMERICAN: >59
GFR NON-AFRICAN AMERICAN: >60
GLUCOSE BLD-MCNC: 137 MG/DL (ref 74–109)
GLUCOSE BLD-MCNC: 171 MG/DL (ref 70–99)
GLUCOSE BLD-MCNC: 178 MG/DL (ref 70–99)
GLUCOSE BLD-MCNC: 225 MG/DL (ref 70–99)
PERFORMED ON: ABNORMAL
POTASSIUM REFLEX MAGNESIUM: 3.9 MMOL/L (ref 3.5–5)
SODIUM BLD-SCNC: 138 MMOL/L (ref 136–145)

## 2020-10-12 PROCEDURE — 2580000003 HC RX 258: Performed by: STUDENT IN AN ORGANIZED HEALTH CARE EDUCATION/TRAINING PROGRAM

## 2020-10-12 PROCEDURE — 80048 BASIC METABOLIC PNL TOTAL CA: CPT

## 2020-10-12 PROCEDURE — 6370000000 HC RX 637 (ALT 250 FOR IP): Performed by: FAMILY MEDICINE

## 2020-10-12 PROCEDURE — 6360000002 HC RX W HCPCS: Performed by: STUDENT IN AN ORGANIZED HEALTH CARE EDUCATION/TRAINING PROGRAM

## 2020-10-12 PROCEDURE — 6370000000 HC RX 637 (ALT 250 FOR IP): Performed by: INTERNAL MEDICINE

## 2020-10-12 PROCEDURE — 36415 COLL VENOUS BLD VENIPUNCTURE: CPT

## 2020-10-12 PROCEDURE — 1210000000 HC MED SURG R&B

## 2020-10-12 PROCEDURE — 6370000000 HC RX 637 (ALT 250 FOR IP): Performed by: STUDENT IN AN ORGANIZED HEALTH CARE EDUCATION/TRAINING PROGRAM

## 2020-10-12 PROCEDURE — 82947 ASSAY GLUCOSE BLOOD QUANT: CPT

## 2020-10-12 RX ORDER — MECOBALAMIN 5000 MCG
5 TABLET,DISINTEGRATING ORAL NIGHTLY PRN
Status: DISCONTINUED | OUTPATIENT
Start: 2020-10-13 | End: 2020-10-14 | Stop reason: HOSPADM

## 2020-10-12 RX ADMIN — SODIUM CHLORIDE, PRESERVATIVE FREE 10 ML: 5 INJECTION INTRAVENOUS at 09:00

## 2020-10-12 RX ADMIN — ENOXAPARIN SODIUM 40 MG: 40 INJECTION SUBCUTANEOUS at 07:56

## 2020-10-12 RX ADMIN — INSULIN LISPRO 1 UNITS: 100 INJECTION, SOLUTION INTRAVENOUS; SUBCUTANEOUS at 13:41

## 2020-10-12 RX ADMIN — ROSUVASTATIN CALCIUM 20 MG: 20 TABLET, FILM COATED ORAL at 07:56

## 2020-10-12 RX ADMIN — CARVEDILOL 12.5 MG: 12.5 TABLET, FILM COATED ORAL at 07:56

## 2020-10-12 RX ADMIN — LOSARTAN POTASSIUM 25 MG: 25 TABLET, FILM COATED ORAL at 07:56

## 2020-10-12 RX ADMIN — ACETAMINOPHEN 650 MG: 325 TABLET ORAL at 07:56

## 2020-10-12 ASSESSMENT — PAIN SCALES - GENERAL: PAINLEVEL_OUTOF10: 1

## 2020-10-12 NOTE — CARE COORDINATION
[facility] called requesting PTOT notes for pt. SW sent. Facility said they are trying to get ahold of pt's son to sign admissions paperwork.

## 2020-10-12 NOTE — CARE COORDINATION
Called patient's son, Lisa Hutchins, to inform him that [place] been [place] [facility] [place] is ready for discharge. Explained that facility is awaiting his assistance in paperwork. Also, inquired if he plans to transport his mother to facility. Left VM. Awaiting return call.   Electronically signed by Gabriel Oro RN on 10/12/2020 at 2:18 PM

## 2020-10-12 NOTE — PROGRESS NOTES
Pt is resting in bed with eyes closed noted earlier; pt slept most of the night the night before noted; 12 hour chart check review completed. Electronically signed by Emily Thompson LPN on 26/79/9820 at 1:42 AM

## 2020-10-12 NOTE — CARE COORDINATION
Patient's son, Jo Lenz, returned call. He reports that he has not received a call/VM from [place] [id number] today. Provided him their phone number. Jo Lenz reports that he can not transport patient to [place], [place] until tomorrow, 10/13/2020.   Electronically signed by Anastasia De Leon RN on 10/12/2020 at 3:29 PM

## 2020-10-12 NOTE — PROGRESS NOTES
Hospitalist Progress Note  10/12/2020 8:06 AM  Subjective:   Admit Date: 10/5/2020  PCP: No primary care provider on file. Chief Complaint: altered mental status    Subjective: anxious to leave because she has bills to pay. Doesn't understand why she is here. Reassured when I tell her about referral to [facility] in [place]. Cumulative Hospital History:   10-5: Brought to ED for evaluation after the police found her wandering. Active missing person report, had driven here from [place], [place] looking for her son (Who lives 4 hours away). No formal diagnosis of dementia but worsening confusion noted. Lives with a , with a nurse next door. Signs of UTI, placed on ceftriaxone and admitted to med/surg. 10-6: Son arrived with the plan of taking the patient back home but she is no less confused yet. Urine culture is showing unspecified growth, full results pending. Patient is trying to leave so will order a sitter to redirect her until she improves further. 10-7: awaiting urine culture data. Calcium remains high despite dose of Zometa and IVFs. Giving calcitonin. 10-8: Urine culture growing Klebsiella. Psych eval her, no concerns. Calcium level improving. 10-9 to 10-11: no acute issues. Awaiting precert to SNF    ROS: Six point review of systems is negative except as specifically addressed above. DIET CARB CONTROL;     Intake/Output Summary (Last 24 hours) at 10/12/2020 0806  Last data filed at 10/12/2020 0242  Gross per 24 hour   Intake 360 ml   Output --   Net 360 ml     Medications:   dextrose      sodium chloride 75 mL/hr at 10/08/20 0944     Current Facility-Administered Medications   Medication Dose Route Frequency Provider Last Rate Last Dose    carvedilol (COREG) tablet 12.5 mg  12.5 mg Oral BID  Cali Jarrett MD   12.5 mg at 10/12/20 0756    glucose (GLUTOSE) 40 % oral gel 15 g  15 g Oral PRN Saeid Saravia MD        dextrose 50 % IV solution  12.5 g Intravenous PRN Ariane Sa MD Kyree        glucagon (rDNA) injection 1 mg  1 mg Intramuscular PRN Price Murillo MD        dextrose 5 % solution  100 mL/hr Intravenous PRN Price Murillo MD        insulin lispro (HUMALOG) injection vial 0-6 Units  0-6 Units Subcutaneous TID WC Price Murillo MD   Stopped at 10/11/20 1723    insulin lispro (HUMALOG) injection vial 0-3 Units  0-3 Units Subcutaneous Nightly Price Murillo MD   1 Units at 10/11/20 2140    cefTRIAXone (ROCEPHIN) 1 g in sterile water 10 mL IV syringe  1 g Intravenous Q24H Price Murillo MD   1 g at 10/11/20 2122    sodium chloride flush 0.9 % injection 10 mL  10 mL Intravenous 2 times per day Price Muirllo MD   10 mL at 10/11/20 2140    sodium chloride flush 0.9 % injection 10 mL  10 mL Intravenous PRN Price Murillo MD        acetaminophen (TYLENOL) tablet 650 mg  650 mg Oral Q6H PRN Price Murillo MD   650 mg at 10/12/20 0756    Or    acetaminophen (TYLENOL) suppository 650 mg  650 mg Rectal Q6H PRN Price Murillo MD        polyethylene glycol (GLYCOLAX) packet 17 g  17 g Oral Daily PRN Price Murillo MD        promethazine (PHENERGAN) tablet 12.5 mg  12.5 mg Oral Q6H PRN Price Murillo MD        Or    ondansetron [facility]) injection 4 mg  4 mg Intravenous Q6H PRN Price Murillo MD        enoxaparin (LOVENOX) injection 40 mg  40 mg Subcutaneous Daily Price Murillo MD   40 mg at 10/12/20 0756    0.9 % sodium chloride infusion   Intravenous Continuous Malachi Ordaz MD 75 mL/hr at 10/08/20 0944      potassium chloride (KLOR-CON M) extended release tablet 40 mEq  40 mEq Oral PRN Price Murillo MD        Or    potassium bicarb-citric acid (EFFER-K) effervescent tablet 40 mEq  40 mEq Oral PRN Price Murillo MD        Or   [facility] potassium chloride 10 mEq/100 mL IVPB (Peripheral Line)  10 mEq Intravenous PRN Price Murillo MD        magnesium sulfate 2 g in 50 mL IVPB premix  2 g Intravenous PRN Price Murillo MD        losartan (COZAAR) tablet 25 mg  25 mg Oral Daily Venkata Olivera, DO   25 mg at 10/12/20 0756    rosuvastatin (CRESTOR) tablet 20 mg  20 mg Oral Daily [place] Olivera, DO   20 mg at 10/12/20 0756    insulin glargine (LANTUS) injection vial 10 Units  10 Units Subcutaneous Nightly [facility], DO   10 Units at 10/11/20 2140    melatonin tablet 5 mg  5 mg Oral Nightly PRN Nilsa Justice MD   5 mg at 10/11/20 2140        Labs:     Recent Labs     10/10/20  0728 10/11/20  0607   WBC 8.6 8.1   RBC 4.18* 3.84*   HGB 12.6 11.5*   HCT 38.8 35.4*   MCV 92.8 92.2   MCH 30.1 29.9   MCHC 32.5* 32.5*    270     Recent Labs     10/10/20  0728 10/11/20  0607 10/12/20  0645    139 138   K 4.3 3.6 3.9   ANIONGAP 15 12 12    107 107   CO2 20* 20* 19*   BUN 18 19 17   CREATININE 0.7 0.7 0.8   GLUCOSE 152* 128* 137*   CALCIUM 10.5* 9.9 9.8     No results for input(s): MG, PHOS in the last 72 hours. No results for input(s): AST, ALT, ALB, BILITOT, ALKPHOS, ALB in the last 72 hours. Objective:   Vitals: /67   Pulse 88   Temp 97.1 °F (36.2 °C) (Temporal)   Resp 18   Ht 5' 7\" (1.702 m)   Wt 181 lb 4 oz (82.2 kg)   SpO2 97%   BMI 28.39 kg/m²   24HR INTAKE/OUTPUT:      Intake/Output Summary (Last 24 hours) at 10/12/2020 0806  Last data filed at 10/12/2020 0242  Gross per 24 hour   Intake 360 ml   Output --   Net 360 ml     General appearance: sitting up on side of bed. Very talkative. Head: NC/AT   Eyes: normal sclera  Mouth: MMM   Lungs: no increased work of breathing, clear to auscultation bilaterally  Heart: tachycardia, regular rhythm  Abdomen: soft, non-tender; bowel sounds normal; no masses,  no organomegaly  Extremities: extremities normal, atraumatic, no cyanosis or edema  Neurologic: awake and alert but not fully oriented. Moving all extremities. Face/ tongue symmetric.    Psych:  hard to distract but cooperative   Skin: no rashes    Assessment and Plan:   Principal Problem:    Sepsis with metabolic encephalopathy

## 2020-10-13 PROBLEM — N39.0 UTI (URINARY TRACT INFECTION): Status: RESOLVED | Noted: 2020-10-05 | Resolved: 2020-10-13

## 2020-10-13 PROBLEM — R65.20 SEPSIS WITH METABOLIC ENCEPHALOPATHY (HCC): Status: RESOLVED | Noted: 2020-10-05 | Resolved: 2020-10-13

## 2020-10-13 PROBLEM — G93.41 SEPSIS WITH METABOLIC ENCEPHALOPATHY (HCC): Status: RESOLVED | Noted: 2020-10-05 | Resolved: 2020-10-13

## 2020-10-13 PROBLEM — A41.9 SEPSIS WITH METABOLIC ENCEPHALOPATHY (HCC): Status: RESOLVED | Noted: 2020-10-05 | Resolved: 2020-10-13

## 2020-10-13 PROBLEM — E21.3 HYPERPARATHYROIDISM (HCC): Chronic | Status: ACTIVE | Noted: 2020-10-13

## 2020-10-13 PROBLEM — R41.3 MEMORY LOSS OR IMPAIRMENT: Chronic | Status: ACTIVE | Noted: 2020-10-13

## 2020-10-13 PROBLEM — E83.52 HYPERCALCEMIA: Status: RESOLVED | Noted: 2020-10-05 | Resolved: 2020-10-13

## 2020-10-13 LAB
ANION GAP SERPL CALCULATED.3IONS-SCNC: 11 MMOL/L (ref 7–19)
BUN BLDV-MCNC: 22 MG/DL (ref 8–23)
CALCIUM SERPL-MCNC: 10.1 MG/DL (ref 8.8–10.2)
CHLORIDE BLD-SCNC: 108 MMOL/L (ref 98–111)
CO2: 21 MMOL/L (ref 22–29)
CREAT SERPL-MCNC: 0.8 MG/DL (ref 0.5–0.9)
GFR AFRICAN AMERICAN: >59
GFR NON-AFRICAN AMERICAN: >60
GLUCOSE BLD-MCNC: 138 MG/DL (ref 70–99)
GLUCOSE BLD-MCNC: 166 MG/DL (ref 74–109)
GLUCOSE BLD-MCNC: 182 MG/DL (ref 70–99)
GLUCOSE BLD-MCNC: 193 MG/DL (ref 70–99)
GLUCOSE BLD-MCNC: 212 MG/DL (ref 70–99)
PERFORMED ON: ABNORMAL
POTASSIUM REFLEX MAGNESIUM: 4.3 MMOL/L (ref 3.5–5)
SARS-COV-2, PCR: NOT DETECTED
SODIUM BLD-SCNC: 140 MMOL/L (ref 136–145)

## 2020-10-13 PROCEDURE — 80048 BASIC METABOLIC PNL TOTAL CA: CPT

## 2020-10-13 PROCEDURE — 2580000003 HC RX 258: Performed by: STUDENT IN AN ORGANIZED HEALTH CARE EDUCATION/TRAINING PROGRAM

## 2020-10-13 PROCEDURE — 82947 ASSAY GLUCOSE BLOOD QUANT: CPT

## 2020-10-13 PROCEDURE — 6370000000 HC RX 637 (ALT 250 FOR IP): Performed by: INTERNAL MEDICINE

## 2020-10-13 PROCEDURE — 36415 COLL VENOUS BLD VENIPUNCTURE: CPT

## 2020-10-13 PROCEDURE — U0003 INFECTIOUS AGENT DETECTION BY NUCLEIC ACID (DNA OR RNA); SEVERE ACUTE RESPIRATORY SYNDROME CORONAVIRUS 2 (SARS-COV-2) (CORONAVIRUS DISEASE [COVID-19]), AMPLIFIED PROBE TECHNIQUE, MAKING USE OF HIGH THROUGHPUT TECHNOLOGIES AS DESCRIBED BY CMS-2020-01-R: HCPCS

## 2020-10-13 PROCEDURE — 6360000002 HC RX W HCPCS: Performed by: STUDENT IN AN ORGANIZED HEALTH CARE EDUCATION/TRAINING PROGRAM

## 2020-10-13 PROCEDURE — 6370000000 HC RX 637 (ALT 250 FOR IP): Performed by: STUDENT IN AN ORGANIZED HEALTH CARE EDUCATION/TRAINING PROGRAM

## 2020-10-13 PROCEDURE — 6370000000 HC RX 637 (ALT 250 FOR IP): Performed by: FAMILY MEDICINE

## 2020-10-13 PROCEDURE — 1210000000 HC MED SURG R&B

## 2020-10-13 RX ADMIN — LOSARTAN POTASSIUM 25 MG: 25 TABLET, FILM COATED ORAL at 08:59

## 2020-10-13 RX ADMIN — ENOXAPARIN SODIUM 40 MG: 40 INJECTION SUBCUTANEOUS at 08:59

## 2020-10-13 RX ADMIN — SODIUM CHLORIDE, PRESERVATIVE FREE 10 ML: 5 INJECTION INTRAVENOUS at 08:59

## 2020-10-13 RX ADMIN — CARVEDILOL 12.5 MG: 12.5 TABLET, FILM COATED ORAL at 08:58

## 2020-10-13 RX ADMIN — INSULIN LISPRO 1 UNITS: 100 INJECTION, SOLUTION INTRAVENOUS; SUBCUTANEOUS at 09:02

## 2020-10-13 RX ADMIN — ROSUVASTATIN CALCIUM 20 MG: 20 TABLET, FILM COATED ORAL at 08:59

## 2020-10-13 NOTE — PLAN OF CARE
Problem: Nutrition  Goal: Optimal nutrition therapy  Outcome: Completed   Nutrition Problem #1: Inadequate oral intake  Intervention: Food and/or Nutrient Delivery: Continue Current Diet  Nutritional Goals: PO intake >50%, wt stable

## 2020-10-13 NOTE — DISCHARGE SUMMARY
Discharge Summary    NAME: Mehnaz Dunaway  :  1945  MRN:  [id number]    Admit date:  10/5/2020  Discharge date:   10/13/2020    Admitting Physician: Ketan Abraham MD    Advance Directive: Full Code    Consults: IP CONSULT TO DIETITIAN  IP CONSULT TO SOCIAL WORK  IP CONSULT TO HOME CARE NEEDS  IP CONSULT TO PSYCHIATRY    Primary Care Physician:  No primary care provider on file. Discharge Diagnoses:  Principal Problem (Resolved):    Sepsis with metabolic encephalopathy ([facility]  Active Problems:    Diabetes mellitus ([facility]    Depression    Hyperparathyroidism ([facility]    Memory loss or impairment  Resolved Problems:    UTI (urinary tract infection)    Hypercalcemia    Encephalopathy acute      HPI:  Patient is a 66 y.o. female with a past medical history of diabetes mellitus.      Patient presented to [facility] ED after being found by police (missing person reported yesterday). After she was found, patient was noted to be confused, asking for directions to several different places in different states (patient is from [facility]). Patient does not recall what happened or how she got there. No known history of dementia, however patient son reports that patient has recently had some memory loss with poor recollection, confusion and repetitive statements. Patient reportedly has not been living with family, but with an elderly roommate.      Initial work-up in the ED, significant for UTI with UA showing moderate leukocyte esterase, 4+ bacteria, numerous WBCs, cloudy urine, also with large amount of blood in urine. Patient unsure about urinary symptoms when questioned, but then endorsed some suprapubic discomfort with pressure and also wears pads due to urinary incontinence. Initial work-up also significant for hyperglycemia with blood glucose 211 and significant hypercalcemia with calcium 12.1.   Patient given dose of Rocephin and 1 L NS bolus in the ED.     She is pleasantly confused but alert during my evaluation, and clearly has poor recollection of events. While infection and metabolic disturbance may be contributing to some AMS, I have high suspicion for underlying dementia. Hospital Course:  10-5: Brought to ED for evaluation after the police found her wandering. Active missing person report, had driven here from [address] looking for her son (Who lives 4 hours away). No formal diagnosis of dementia but worsening confusion noted. Lives with a , with a nurse next door. Signs of UTI, placed on ceftriaxone and admitted to med/surg. 10-6: Son arrived with the plan of taking the patient back home but she is no less confused yet. Urine culture is showing unspecified growth, full results pending. Patient is trying to leave so will order a sitter to redirect her until she improves further. 10-7: awaiting urine culture data. Calcium remains high despite dose of Zometa and IVFs. Giving calcitonin. 10-8: Urine culture growing Klebsiella. Psych eval her, no concerns. Calcium level improving. 10-9 to 10-11: no acute issues. Awaiting precert to SNF    Altered mental status, 2/2 metabolic encephalopathy in setting of UTI and hypercalcemia, as well as likely underlying dementia  - improving. Suspect she is close to her baseline now. - Address potential contributing factors including UTI and hypercalcemia  -psychiatry consulted, no evidence of psychosis. Thought Encephalopathy in the setting of infection and hypercalcemia is resolving.  Cannot rule out dementia given collateral information - she would benefit from outpatient work up     Sepsis 2/2 Klebsiella UTI - both POA with tachycardia, leukocytosis, UA c/w infection   - s/p seven days rocephin     Hypercalcemia - PTH elevated, c/w primary hyperparathyroidism.   - Will need to be evaluated by endocrinology on discharge. She tells me she already follows with one.   - Level improved mildly initially with dose of Zometa / IVFs, but responded well to dose x2 of Calcitonin on 10/7.      Sinus tachycardia - hyperCa and infection could be contributing. She is also excitable on exam. Increased dose of BB and tx above. Better.     Diabetes mellitus, with hyperglycemia- continue home meds on discharge. Significant Diagnostic Studies:   Ct Abdomen Pelvis Wo Contrast Additional Contrast? None    Result Date: 10/5/2020  Examination. CT ABDOMEN PELVIS WO CONTRAST 10/5/2020 6:08 AM History: Altered mental status. Significant hypercalcemia. DLP: 438 mGycm. The CT scan of the abdomen and pelvis is performed without intravenous contrast enhancement. The images are acquired in axial plane with subsequent reconstruction in coronal and sagittal plane. There is no previous study for comparison. Lung bases included in the study show atelectatic changes. The lungs are poorly inflated. The limited included cardiomediastinal structures show severe atheromatous changes of coronary arteries and heavy calcification of the mitral annulus. The unenhanced liver and spleen appear unremarkable. There are a few small gallstones. Unenhanced pancreas is unremarkable. The adrenal glands bilaterally are unremarkable. A low-density nodule is seen located laterally in the mid right kidney measuring 2.7 cm in diameter. The CT density suggest a cyst. There is another low-density nodule located anteriorly in the lower pole of the left kidney measuring 2.2 cm in diameter. The CT density suggest a cyst. No radiopaque calculi. No hydronephrosis. The ureters appear normal. The urinary bladder is poorly distended. There is gas in the wall of the of the urinary bladder representing emphysematous cystitis. The uterus is surgically absent. No adnexal masses. The stomach and duodenum are normal. The small bowel is unremarkable. Appendix is surgically absent. There is large amount of stool throughout the colon. No finding to suggest obstruction.  Atheromatous changes of the abdominal aorta and iliac arteries are seen. No aneurysmal dilatation. There is no evidence of abdominal or pelvic lymphadenopathy. The images reviewed in bone window show chronic degenerative changes of the lumbar and visualized thoracic spine. The Postsurgical changes/hardware internal fixation of the left proximal femur is noted. The visualized ribs appear unremarkable. Emphysematous cystitis. Bilateral renal cysts. A Large amount of stool in the colon without evidence of obstruction may suggest chronic constipation. Above findings are recorded on a digital voice clip in PACS. Signed by Dr Vishal Casey on 10/5/2020 7:31 AM    Xr Chest (2 Vw)    Result Date: 10/5/2020  EXAM: XR CHEST (2 VW) -- 10/5/2020 1:06 AM HISTORY: 66 years, Female, delirium COMPARISON: Mammogram on TECHNIQUE:  2 images. Frontal and lateral views of the chest. FINDINGS:  Low lung volumes. No pneumothorax or large pleural effusion. No focal consolidation. Sternotomy wires. Cardiac and mediastinal silhouette appear within normal limits. Severe degenerative changes of the bilateral shoulders. No acute bony findings. Mild degenerative changes in the visualized spine. See separately dictated CT chest, which is pending at time of dictation. 1. No acute cardiopulmonary finding. See separately dictated CT chest, which is pending at time of dictation. Signed by Dr Bob Devine on 10/5/2020 7:10 AM    Ct Head Wo Contrast    Result Date: 10/5/2020  Examination. CT HEAD WO CONTRAST 10/5/2020 1:08 AM History: Altered mental status. DLP: 824 mGycm. The CT scan of the head is performed without intravenous contrast enhancement. The images are acquired in axial, reconstruction in coronal and sagittal planes. There is no previous study for comparison. There is no evidence of a mass or midline shift. There is no evidence of intracranial hemorrhage or hematoma.  The ventricles, the basal cistern and the cortical sulci are moderately prominent suggesting moderate chronic volume loss. An empty sella turcica is seen. The images reviewed in bone window show no acute bony abnormality. No acute intracranial abnormality. The above study was initially reviewed and reported by stat rads. I do not find any discrepancies. Signed by Dr Caroline Rueda on 10/5/2020 6:43 AM    Ct Chest Wo Contrast    Result Date: 10/5/2020  EXAM: CT CHEST WO CONTRAST -- 10/5/2020 6:08 AM HISTORY: 75 years, Female, altered mental status, hypercalcemia, evaluate for infiltrate/mass COMPARISON: Same day chest radiograph 10/5/2020 DLP: 438 mGy cm. Automated exposure control was utilized to minimize patient radiation dose. TECHNIQUE: Unenhanced CT images obtained with multiplanar reformats. FINDINGS: AIRWAYS/PULMONARY PARENCHYMA: The central airways are midline and patent. No focal pulmonary consolidation. Minimal streaky atelectasis at the lung bases. No pleural effusion or pneumothorax. VASCULATURE: Limited assessment of vasculature without intravenous contrast. Thoracic aorta is normal in course and caliber. Mild calcified aortic atherosclerosis. Normal pulmonary artery caliber. CARDIAC:  Cardiac size is normal.  No pericardial effusion. Coronary artery calcifications. Changes of prior mediastinal surgery. MEDIASTINUM: There is no mediastinal or hilar lymphadenopathy by CT size criteria. Esophagus has normal coarse, caliber and wall thickness. EXTRATHORACIC: The visualized portions of the thyroid gland are unremarkable. No thoracic inlet or axillary adenopathy. The extrathoracic soft tissues are within normal limits. INCLUDED UPPER ABDOMEN: See separately dictated CT abdomen of the same day. Gallstones. Low-density lesion at the right upper kidney OSSEOUS: Degenerative changes of the bilateral shoulders and spine. Sternotomy wires.  Posterior vertebral body osteophytes at multiple thoracic scratch lumbar levels with probable at least mild spinal canal stenosis, not optimally evaluated on this exam. DIET CARB CONTROL; Activity: Resume as tolerated     Disposition: Patient is medically stable and will be discharged to SNF. Time spent on discharge > 35 minutes. Signed:   Dylan Soto MD

## 2020-10-13 NOTE — PROGRESS NOTES
Hospitalist Progress Note  10/13/2020 5:15 PM  Subjective:   Admit Date: 10/5/2020  PCP: No primary care provider on file. Chief Complaint: altered mental status    Subjective: Planning to discharge today but apparently pre-cert not actually obtained. See CM note. Patient was doing well when I saw her earlier. Son was updated at bedside. [facility]:   10-5: Brought to ED for evaluation after the police found her wandering. Active missing person report, had driven here from [address] looking for her son (Who lives 4 hours away). No formal diagnosis of dementia but worsening confusion noted. Lives with a , with a nurse next door. Signs of UTI, placed on ceftriaxone and admitted to med/surg. 10-6: Son arrived with the plan of taking the patient back home but she is no less confused yet. Urine culture is showing unspecified growth, full results pending. Patient is trying to leave so will order a sitter to redirect her until she improves further. 10-7: awaiting urine culture data. Calcium remains high despite dose of Zometa and IVFs. Giving calcitonin. 10-8: Urine culture growing Klebsiella. Psych eval her, no concerns. Calcium level improving. 10-9 to 10-11: no acute issues. Awaiting precert to SNF    ROS: Six point review of systems is negative except as specifically addressed above. DIET CARB CONTROL;     Intake/Output Summary (Last 24 hours) at 10/13/2020 1715  Last data filed at 10/13/2020 1439  Gross per 24 hour   Intake 440 ml   Output --   Net 440 ml     Medications:   dextrose      sodium chloride 75 mL/hr at 10/08/20 0944     Current Facility-Administered Medications   Medication Dose Route Frequency Provider Last Rate Last Dose    melatonin disintegrating tablet 5 mg  5 mg Oral Nightly PRN Benito Coppola MD        carvedilol (COREG) tablet 12.5 mg  12.5 mg Oral BID  Estee Omalley MD   12.5 mg at 10/13/20 0858    glucose (GLUTOSE) 40 % oral gel 15 g  15 g Oral PRN Marco Antonio Mauro MD        dextrose 50 % IV solution  12.5 g Intravenous PRN Marco Antonio Mauro MD        glucagon (rDNA) injection 1 mg  1 mg Intramuscular PRN Marco Antonio Mauro MD        dextrose 5 % solution  100 mL/hr Intravenous PRN Marco Antonio Mauro MD        insulin lispro (HUMALOG) injection vial 0-6 Units  0-6 Units Subcutaneous TID WC Marco Antonio Mauro MD   1 Units at 10/13/20 0902    insulin lispro (HUMALOG) injection vial 0-3 Units  0-3 Units Subcutaneous Nightly Marco Antonio Mauro MD   1 Units at 10/11/20 2140    sodium chloride flush 0.9 % injection 10 mL  10 mL Intravenous 2 times per day Marco Antonio Mauro MD   10 mL at 10/13/20 0859    sodium chloride flush 0.9 % injection 10 mL  10 mL Intravenous PRN Marco Antonio Mauro MD        acetaminophen (TYLENOL) tablet 650 mg  650 mg Oral Q6H PRN Marco Antonio Mauro MD   650 mg at 10/12/20 0756    Or    acetaminophen (TYLENOL) suppository 650 mg  650 mg Rectal Q6H PRN Marco Antonio Mauro MD        polyethylene glycol (GLYCOLAX) packet 17 g  17 g Oral Daily PRN Marco Antonio Mauro MD        promethazine (PHENERGAN) tablet 12.5 mg  12.5 mg Oral Q6H PRN Marco Antonio Mauro MD        Or    ondansetron [facility]) injection 4 mg  4 mg Intravenous Q6H PRN Marco Antonio Mauro MD        enoxaparin (LOVENOX) injection 40 mg  40 mg Subcutaneous Daily Marco Antonio Mauro MD   40 mg at 10/13/20 0859    0.9 % sodium chloride infusion   Intravenous Continuous Le Schultz MD 75 mL/hr at 10/08/20 0944      potassium chloride (KLOR-CON M) extended release tablet 40 mEq  40 mEq Oral PRJERMAN Mauro MD        Or   Larry Perdomo potassium bicarb-citric acid (EFFER-K) effervescent tablet 40 mEq  40 mEq Oral PRN Marco Antonio Mauro MD        Or   Larry Loser potassium chloride 10 mEq/100 mL IVPB (Peripheral Line)  10 mEq Intravenous PRN Marco Antonio Mauro MD        magnesium sulfate 2 g in 50 mL IVPB premix  2 g Intravenous PRN Marco Antonio Mauro MD        losartan (COZAAR) tablet 25 mg  25 mg Oral Daily Gustine Olivera, DO   25 mg at 10/13/20 0859    rosuvastatin (CRESTOR) tablet 20 mg  20 mg Oral Daily Gustine Olivera, DO   20 mg at 10/13/20 0859    insulin glargine (LANTUS) injection vial 10 Units  10 Units Subcutaneous Nightly [facility], DO   10 Units at 10/11/20 2140        Labs:     Recent Labs     10/11/20  0607   WBC 8.1   RBC 3.84*   HGB 11.5*   HCT 35.4*   MCV 92.2   MCH 29.9   MCHC 32.5*        Recent Labs     10/11/20  0607 10/12/20  0645 10/13/20  0609    138 140   K 3.6 3.9 4.3   ANIONGAP 12 12 11    107 108   CO2 20* 19* 21*   BUN 19 17 22   CREATININE 0.7 0.8 0.8   GLUCOSE 128* 137* 166*   CALCIUM 9.9 9.8 10.1     No results for input(s): MG, PHOS in the last 72 hours. No results for input(s): AST, ALT, ALB, BILITOT, ALKPHOS, ALB in the last 72 hours. Objective:   Vitals: BP (!) 147/91   Pulse 94   Temp 97 °F (36.1 °C) (Temporal)   Resp 20   Ht 5' 7\" (1.702 m)   Wt 181 lb 4 oz (82.2 kg)   SpO2 98%   BMI 28.39 kg/m²   24HR INTAKE/OUTPUT:      Intake/Output Summary (Last 24 hours) at 10/13/2020 1715  Last data filed at 10/13/2020 1439  Gross per 24 hour   Intake 440 ml   Output --   Net 440 ml     General appearance: sitting up on side of bed. Very talkative. Head: NC/AT   Eyes: normal sclera  Mouth: MMM   Lungs: no increased work of breathing, clear to auscultation bilaterally  Heart: tachycardia, regular rhythm  Abdomen: soft, non-tender; bowel sounds normal; no masses,  no organomegaly  Extremities: extremities normal, atraumatic, no cyanosis or edema  Neurologic: awake and alert but not fully oriented. Moving all extremities. Face/ tongue symmetric.    Psych:  hard to distract but cooperative   Skin: no rashes    Assessment and Plan:   Principal Problem (Resolved):    Sepsis with metabolic encephalopathy ([address]  Active Problems:    Diabetes mellitus ([facility]    Depression    [facility] ([facility]    Memory loss or impairment  Resolved Problems:    UTI (urinary tract infection)    Hypercalcemia    Encephalopathy acute    Altered mental [id number], 5/2 metabolic encephalopathy in setting of UTI and hypercalcemia, as well as likely underlying dementia  - improving. Suspect she is close to her baseline now. - Address potential contributing factors including UTI and hypercalcemia  -psychiatry consulted, no evidence of psychosis. Thought Encephalopathy in the setting of infection and hypercalcemia is resolving.  Cannot rule out dementia given collateral information - she would benefit from outpatient work up     Sepsis 2/2 Klebsiella UTI - both POA with tachycardia, leukocytosis, UA c/w infection   - s/p seven days rocephin     Hypercalcemia - PTH elevated, c/w primary hyperparathyroidism.   - Will need to be evaluated by endocrinology on discharge.  She tells me she already follows with one. - Level improved mildly initially with dose of Zometa / IVFs, but responded well to dose x2 of Calcitonin on 10/7.      Sinus tachycardia - hyperCa and infection could be contributing. She is also excitable on exam. Increased dose of BB and tx above. Better.     Diabetes mellitus, with hyperglycemia- continue home meds on discharge. Advance Directive: Full Code    DVT prophylaxis: lovenox     Discharge planning: SNF, precert pending. Medically ready. Signed:   Angel See MD 10/13/2020 5:15 PM  Hospitalist

## 2020-10-13 NOTE — CARE COORDINATION
Spoke with Andrew Veras, Director of [address] [place] [address], at [place] [id number] regarding status of pre-cert. Andrew Veras reports that pre-cert authorization has not yet been received. Patient's insurance company asked for MD note this morning and was sent by Chito Gonzalez. [email or web address] is also waiting on paperwork from patient's son, Leslie Carnes, and [place] results. Covid-19 results are still pending. Leslie Carnes is present in patient's room. This CM offered to fax paperwork for son to [facility] [[email or web address]]. Explained to Andrew Veras that patient's son has taken a day off from from work to transport patient to their facility, located in [place], [place]. Leslie Carnes is from [place], [place] and plans to return to his home this evening. Andrew Veras plans to call their Authorization department to learn if precert will be obtained today. Awaiting response.

## 2020-10-13 NOTE — ADT AUTH CERT
[facility] (10/11/2020) by Dia Kaba RN         Review Status  Review Entered    In Primary  10/13/2020 13:18        Criteria Review    REVIEW SUMMARY     Patient: Clare Donovan  Review Number: [id number]  Review Status: In Primary     Condition Specific: Yes     Condition Level Of Care Code: ACUTE  Condition Level Of Care Description: Acute        OUTCOMES  Outcome Type: Primary           REVIEW DETAILS     Service Date: 10/11/2020  Product: [facility] [facility]  Subset: Extended Stay      (Symptom or finding within 24h)         (Excludes PO medications unless noted)          Select Level of Care, One:              [ ] ACUTE, >= One:                  [ ] Genitourinary, One:                  ~--Admin, IQ Admin Admin on 10- 02:18 PM--~                  10/11                  VS- 97.6 18 83 96/58 96% RA                  CO2   19 (L)                                                Glucose          137 (H)                                             POC Glucose              171 (H) 178 (H) 225 (H)                  Calcium          9.8                                                                      Meds: coreg 12.5mg po x2, Rocephin 1gm iv x1, lovenox 40mg sc x1, lantus 10 units sc x1, Humalog ssi, cozaar 25mg po x1, crestor 20mg po x1, melatonin 5mg p ox1                                    Subjective: Sitting at the window. Doing fine today. Remembers me from yesterday. General appearance: sitting up on side of bed. Very talkative.                    Head: NC/AT                   Eyes: normal sclera                  Mouth: MMM                   Lungs: no increased work of breathing, clear to auscultation bilaterally                  Heart: tachycardia, regular rhythm                  Abdomen: soft, non-tender; bowel sounds normal; no masses,  no organomegaly                  Extremities: extremities normal, atraumatic, no cyanosis or edema Neurologic: awake and alert but not fully oriented. Moving all extremities. Face/ tongue symmetric. Psych:  hard to distract but cooperative                   Skin: no rashes                                     Assessment and Plan:                  Principal Problem:                    Sepsis with metabolic encephalopathy ([address]                  Active Problems:                    UTI (urinary tract infection)                    Hypercalcemia                    Diabetes mellitus ([address]                    Encephalopathy acute                    Depression                  Resolved Problems:                    * No resolved hospital problems. *                                     Altered mental status, 2/2 metabolic encephalopathy in setting of UTI and hypercalcemia, as well as likely underlying dementia                  - improving. Suspect she is close to her baseline now. - Address potential contributing factors including UTI and hypercalcemia                  -psychiatry consulted, no evidence of psychosis. Thought Encephalopathy in the setting of infection and hypercalcemia is resolving. Cannot rule out dementia given collateral information - she would benefit from outpatient work up                                     Sepsis 2/2 Klebsiella UTI - both POA with tachycardia, leukocytosis, UA c/w infection                   - Continue rocephin, sensitive to this (day 7/7). Could switch to oral abx on discharge                                     Hypercalcemia - PTH elevated, c/w primary hyperparathyroidism.                   - Will need to be evaluated by endocrinology on discharge. She tells me she already follows with one. - Level improved mildly initially with dose of Zometa / IVFs, but responded well to dose x2 of Calcitonin on 10/7. Sinus tachycardia - hyperCa and infection could be contributing.  She is also excitable on exam. Increased dose of BB and tx above. Diabetes mellitus, with hyperglycemia                  -POC glucose checks every 6 hours                  -lantus and Sliding scale insulin as indicated                                     Advance Directive: Full Code                                     DVT prophylaxis: lovenox                                      Discharge planning: SNF, precert pending. Medically ready. PT                            10/11/20 0820                  Subjective                  Subjective       Pt states she is confused as to why no one is helping her get out of here. Pt willling to walk with therapy. General Comment                  Comments      Pt noted to be up ad adrian in room and merritt. Ambulation                  Ambulation? Yes                  WB Status      WBAT                  Ambulation 1                  Device            No Device                  Assistance      Supervision                  Distance         700'                  Comments      steady with amb. Short term goals                  Time Frame for Short term goals          2 weeks                  Short term goal 1         Ambulate 400 feet independently and safely                  Conditions Requiring Skilled Therapeutic Intervention                  Body structures, Functions, Activity limitations  Decreased safe awareness;Decreased cognition                  Assessment    Pt is doing well with amb. Activity Tolerance                  Activity Tolerance        Patient Tolerated treatment well                                                           Version: InterQual® 2019.1  Reynold Chan and InterQual®  © 2019 [employer] and/or one of its [place]. All Rights Reserved. CPT only © 2018 American Medical Association.   All Rights Reserved.     [facility] (10/10/2020) by Willis Razo RN         Review Status  Review Entered    In Primary  10/13/2020 13:14        Criteria Review    REVIEW SUMMARY     Patient: Yamilex Terry  Review Number: [id number]  Review Status: In Primary     Condition Specific: Yes     Condition Level Of Care Code: ACUTE  Condition Level Of Care Description: Acute        OUTCOMES  Outcome Type: Primary           REVIEW DETAILS     Service Date: 10/10/2020  Product: [facility] [facility]  Subset: Extended Stay      (Symptom or finding within 24h)         (Excludes PO medications unless noted)          Select Level of Care, One:              [ ] ACUTE, >= One:              ~--Admin, IQ Admin Admin on 10- 02:14 PM--~              10/10              97.7 14 108 142/92 98%              CO2       20 (L)                                                         Glucose 152 (H)                                                      POC Glucose                  134 (H) 183 (H) 151 (H) 211 (H)              Calcium 10.5 (H)                                                                  RBC       4.18 (L)                                                      MCHC    32.5 (L)                                                      RDW      15.9 (H)                                                                  Neutrophils %     72.9 (H)                                                                  Lymphocyte %    17.9 (L)                                                                    Meds: coreg 12.5mg po x2, Rocephin 1gm iv x1, lovenox 40mg sc x1, lantus 10 units sc x1, Humalog ssi, cozaar 25mg po x1, crestor 20mg po x1                             Subjective:  Has finished reading her [employer] and wants to know where she can find another. Then asked me where her car was parked. Reminded her that her son came to get it. She does not remember why she is here. I reminded her about hyperCa and UTI.  Then asked, \"I [place] to come here and you aren't even going to do surgery on me? ?\"              Assessment and Plan:              Principal Problem:                Sepsis with metabolic encephalopathy ([facility]              Active Problems:                UTI (urinary tract infection)                Hypercalcemia                Diabetes mellitus ([facility]                Encephalopathy acute                Depression              Resolved Problems:                * No resolved hospital problems. *                             Altered mental status, 2/2 metabolic encephalopathy in setting of UTI and hypercalcemia, as well as likely underlying dementia              - improving. Suspect she is close to her baseline now. - Address potential contributing factors including UTI and hypercalcemia              -psychiatry consulted, no evidence of psychosis. Thought Encephalopathy in the setting of infection and hypercalcemia is resolving. Cannot rule out dementia given collateral information - she would benefit from outpatient work up                             Sepsis 2/2 Klebsiella UTI - both POA with tachycardia, leukocytosis, UA c/w infection               - Continue rocephin, sensitive to this (day 6/7). Could switch to oral abx on discharge                             Hypercalcemia - PTH elevated, c/w primary hyperparathyroidism.               - Will need to be evaluated by endocrinology on discharge. She tells me she already follows with one. - Level improved mildly initially with dose of Zometa / IVFs, but responded well to dose x2 of Calcitonin on 10/7. Sinus tachycardia - hyperCa and infection could be contributing. She is also excitable on exam. Increased dose of BB and tx above.                              Diabetes mellitus, with hyperglycemia              -POC glucose checks every 6 hours              -lantus and Sliding scale insulin as indicated                             Advance Directive: Full Code                             DVT prophylaxis: lovenox                              Discharge planning: SNF, precert pending. Medically ready. PT Assessment               Body structures, Functions, Activity limitations: Decreased safe awareness;Decreased functional mobility               Assessment: Patient will benefit from continuing skilled physical therapy to improve mobility              Treatment Diagnosis: Unsteady gait              Prognosis: Good              Decision Making: Low Complexity              REQUIRES PT FOLLOW UP: Yes              Activity Tolerance              Activity Tolerance: Patient Tolerated treatment well                                                            [ ] Genitourinary, One:                      [ ] Responder, discharge expected today if clinically stable last 12h, Both:                          [X] Temperature, >= One:                              [X] <= 99.4°F(37.4°C) PO                              ~--Admin, IQ Admin Admin on 10- 02:14 PM--~                              97.7                                                                                                                    [ ] Acute kidney injury ([facility]) and, >= One:                              [X] Kidney function improving or within acceptable limits                              ~--Admin, IQ Admin Admin on 10- 02:14 PM--~                              wnl                                                                                               Version: InterQual® 2019.1  [facility]  © 2019 [employer] and/or one of its subsidiaries. All Rights Reserved. CPT only © 2018 American Medical Association. All Rights Reserved.

## 2020-10-13 NOTE — CARE COORDINATION
Multiple calls placed with TAYLOR Spence, and Cole, Director of [address] [place] [address], with [facility] to address issue of obtaining pre-cert. They have submitted all requsted information to [employer], but a decision has not been received. [facility] offered to accept patient this evening, but patient will have to pay cost out-of-pocket, which is $250/night. Patient's son, Vazquez De Leon, states that he was informed by [facility] that patient could be admitted today also. He is not willing to pay the $250/night. After much discussion of options, [facility] has [facility] awaiting pre-cert decision. Kirby Maher, Fourth , participated in conversations with patient's son and [facility]. MD informed.   Electronically signed by Santhosh Núñez RN on 10/13/2020 at 5:06 PM

## 2020-10-14 VITALS
HEIGHT: 67 IN | DIASTOLIC BLOOD PRESSURE: 62 MMHG | HEART RATE: 91 BPM | BODY MASS INDEX: 28.45 KG/M2 | WEIGHT: 181.25 LBS | TEMPERATURE: 97.2 F | OXYGEN SATURATION: 96 % | SYSTOLIC BLOOD PRESSURE: 107 MMHG | RESPIRATION RATE: 18 BRPM

## 2020-10-14 LAB
ANION GAP SERPL CALCULATED.3IONS-SCNC: 9 MMOL/L (ref 7–19)
BUN BLDV-MCNC: 20 MG/DL (ref 8–23)
CALCIUM SERPL-MCNC: 10 MG/DL (ref 8.8–10.2)
CHLORIDE BLD-SCNC: 109 MMOL/L (ref 98–111)
CO2: 20 MMOL/L (ref 22–29)
CREAT SERPL-MCNC: 0.9 MG/DL (ref 0.5–0.9)
GFR AFRICAN AMERICAN: >59
GFR NON-AFRICAN AMERICAN: >60
GLUCOSE BLD-MCNC: 143 MG/DL (ref 70–99)
GLUCOSE BLD-MCNC: 156 MG/DL (ref 74–109)
GLUCOSE BLD-MCNC: 192 MG/DL (ref 70–99)
PERFORMED ON: ABNORMAL
PERFORMED ON: ABNORMAL
POTASSIUM REFLEX MAGNESIUM: 3.8 MMOL/L (ref 3.5–5)
SODIUM BLD-SCNC: 138 MMOL/L (ref 136–145)

## 2020-10-14 PROCEDURE — 6370000000 HC RX 637 (ALT 250 FOR IP): Performed by: STUDENT IN AN ORGANIZED HEALTH CARE EDUCATION/TRAINING PROGRAM

## 2020-10-14 PROCEDURE — 2580000003 HC RX 258: Performed by: STUDENT IN AN ORGANIZED HEALTH CARE EDUCATION/TRAINING PROGRAM

## 2020-10-14 PROCEDURE — 6360000002 HC RX W HCPCS: Performed by: STUDENT IN AN ORGANIZED HEALTH CARE EDUCATION/TRAINING PROGRAM

## 2020-10-14 PROCEDURE — 36415 COLL VENOUS BLD VENIPUNCTURE: CPT

## 2020-10-14 PROCEDURE — 82947 ASSAY GLUCOSE BLOOD QUANT: CPT

## 2020-10-14 PROCEDURE — 6370000000 HC RX 637 (ALT 250 FOR IP): Performed by: FAMILY MEDICINE

## 2020-10-14 PROCEDURE — 6370000000 HC RX 637 (ALT 250 FOR IP): Performed by: INTERNAL MEDICINE

## 2020-10-14 PROCEDURE — 80048 BASIC METABOLIC PNL TOTAL CA: CPT

## 2020-10-14 RX ADMIN — ROSUVASTATIN CALCIUM 20 MG: 20 TABLET, FILM COATED ORAL at 08:25

## 2020-10-14 RX ADMIN — SODIUM CHLORIDE, PRESERVATIVE FREE 10 ML: 5 INJECTION INTRAVENOUS at 00:10

## 2020-10-14 RX ADMIN — Medication 10 UNITS: at 00:11

## 2020-10-14 RX ADMIN — CARVEDILOL 12.5 MG: 12.5 TABLET, FILM COATED ORAL at 08:26

## 2020-10-14 RX ADMIN — Medication 5 MG: at 00:10

## 2020-10-14 RX ADMIN — CARVEDILOL 12.5 MG: 12.5 TABLET, FILM COATED ORAL at 00:10

## 2020-10-14 RX ADMIN — ENOXAPARIN SODIUM 40 MG: 40 INJECTION SUBCUTANEOUS at 08:26

## 2020-10-14 RX ADMIN — LOSARTAN POTASSIUM 25 MG: 25 TABLET, FILM COATED ORAL at 08:25

## 2020-10-14 NOTE — CARE COORDINATION
[place] called - pt's pre-cert has gone through and is approved. Pt's son is present to transport pt.    [facility]   Ph: [phone]  F: [phone]

## 2020-10-14 NOTE — PROGRESS NOTES
1300-   Report called to [place] at [facility]. Pt discharged with son per private vehicle .      Electronically signed by Kyleigh Rainey LPN on 45/51/4464 at 1:03 PM

## 2020-10-14 NOTE — DISCHARGE SUMMARY
Discharge Summary    NAME: Jacob Person  :  1945  MRN:  [id number]    Admit date:  10/5/2020  Discharge date:   10/14/2020    Admitting Physician:  Adan Rosario MD    Advance Directive: Full Code    Consults:  Psychiatry    Primary Care Physician:  No primary care provider on file. Discharge Diagnoses:      Sepsis with metabolic encephalopathy ([address]    Diabetes mellitus ([address]    Depression    Hyperparathyroidism ([address]    Memory loss or impairment    UTI (urinary tract infection)    Hypercalcemia    Encephalopathy acute      Significant Diagnostic Studies:   Ct Abdomen Pelvis Wo Contrast Additional Contrast? None    Result Date: 10/5/2020  Examination. CT ABDOMEN PELVIS WO CONTRAST 10/5/2020 6:08 AM History: Altered mental status. Significant hypercalcemia. DLP: 438 mGycm. The CT scan of the abdomen and pelvis is performed without intravenous contrast enhancement. The images are acquired in axial plane with subsequent reconstruction in coronal and sagittal plane. There is no previous study for comparison. Lung bases included in the study show atelectatic changes. The lungs are poorly inflated. The limited included cardiomediastinal structures show severe atheromatous changes of coronary arteries and heavy calcification of the mitral annulus. The unenhanced liver and spleen appear unremarkable. There are a few small gallstones. Unenhanced pancreas is unremarkable. The adrenal glands bilaterally are unremarkable. A low-density nodule is seen located laterally in the mid right kidney measuring 2.7 cm in diameter. The CT density suggest a cyst. There is another low-density nodule located anteriorly in the lower pole of the left kidney measuring 2.2 cm in diameter. The CT density suggest a cyst. No radiopaque calculi. No hydronephrosis. The ureters appear normal. The urinary bladder is poorly distended. There is gas in the wall of the of the urinary bladder representing emphysematous cystitis.  The uterus is surgically absent. No adnexal masses. The stomach and duodenum are normal. The small bowel is unremarkable. Appendix is surgically absent. There is large amount of stool throughout the colon. No finding to suggest obstruction. Atheromatous changes of the abdominal aorta and iliac arteries are seen. No aneurysmal dilatation. There is no evidence of abdominal or pelvic lymphadenopathy. The images reviewed in bone window show chronic degenerative changes of the lumbar and visualized thoracic spine. The Postsurgical changes/hardware internal fixation of the left proximal femur is noted. The visualized ribs appear unremarkable. Emphysematous cystitis. Bilateral renal cysts. A Large amount of stool in the colon without evidence of obstruction may suggest chronic constipation. Above findings are recorded on a digital voice clip in PACS. Signed by Dr Geovany Ochoa on 10/5/2020 7:31 AM    Xr Chest (2 Vw)    Result Date: 10/5/2020  EXAM: XR CHEST (2 VW) -- 10/5/2020 1:06 AM HISTORY: 66 years, Female, delirium COMPARISON: Mammogram on TECHNIQUE:  2 images. Frontal and lateral views of the chest. FINDINGS:  Low lung volumes. No pneumothorax or large pleural effusion. No focal consolidation. Sternotomy wires. Cardiac and mediastinal silhouette appear within normal limits. Severe degenerative changes of the bilateral shoulders. No acute bony findings. Mild degenerative changes in the visualized spine. See separately dictated CT chest, which is pending at time of dictation. 1. No acute cardiopulmonary finding. See separately dictated CT chest, which is pending at time of dictation. Signed by Dr Ingrid Vila on 10/5/2020 7:10 AM    Ct Head Wo Contrast    Result Date: 10/5/2020  Examination. CT HEAD WO CONTRAST 10/5/2020 1:08 AM History: Altered mental status. DLP: 824 mGycm. The CT scan of the head is performed without intravenous contrast enhancement.  The images are acquired in axial, reconstruction in coronal and upper kidney OSSEOUS: Degenerative changes of the bilateral shoulders and spine. Sternotomy wires. Posterior vertebral body osteophytes at multiple thoracic scratch lumbar levels with probable at least mild spinal canal stenosis, not optimally evaluated on this exam.     1. No acute cardiopulmonary finding. 2. See separately dictated CT abdomen of the same day. Gallstones. 3. Posterior vertebral body osteophytes at multiple thoracic and lumbar levels with probable at least mild spinal canal stenosis, not optimally evaluated on this exam. Signed by Dr Yasmin Otoole on 10/5/2020 7:14 AM      Pertinent Labs:   CBC: No results for input(s): WBC, HGB, PLT in the last 72 hours. BMP:    Recent Labs     10/12/20  0645 10/13/20  0609 10/14/20  0417    140 138   K 3.9 4.3 3.8    108 109   CO2 19* 21* 20*   BUN 17 22 20   CREATININE 0.8 0.8 0.9   GLUCOSE 137* 166* 156*             Hospital Course:    65 yo F presented with acute encephalopathy after found by police as missing person from [place], [place]. Suspected Dx of Dementia. Found to have UTI, which improved with IV Rocephin. UCx did not speciate any particular bacteria. Also with persistent hypercalcemia, despite IVF and Zometa. Elevated PTH. Given calcitonin. Persistent behavior issue and evaluated by Psych w/o concern. Overall condition improved medically after completing adequate Abx course, IVF and supportive care. Evaluated by PT and eventually obtained SNF placement at [facility] in [place]. Physical Exam:  Vital Signs: /62   Pulse 91   Temp 97.2 °F (36.2 °C) (Temporal)   Resp 18   Ht 5' 7\" (1.702 m)   Wt 181 lb 4 oz (82.2 kg)   SpO2 96%   BMI 28.39 kg/m²   General appearance:. Alert and Cooperative   HEENT: Normocephalic. Chest: clear to auscultation bilaterally without wheezes or rhonchi. Cardiac: Normal heart tones with regular rate and rhythm, S1, S2 normal. No murmurs, gallops, or rubs auscultated.    Abdomen:soft, non-tender; normal bowel sounds, no masses, no organomegaly. Extremities: No clubbing or cyanosis. No peripheral edema. Peripheral pulses palpable. Neurologic: Grossly intact. Discharge Medications:       Zainyce Starch   Home Medication Instructions STEPHANIE:[id number]    Printed on:10/14/20 5068   Medication Information                      carvedilol (COREG) 6.25 MG tablet  Take 6.25 mg by mouth 2 times daily (with meals)             Dapagliflozin-metFORMIN HCl ER (XIGDUO XR) 5-1000 MG TB24  Take by mouth 2 times daily             glimepiride (AMARYL) 4 MG tablet  Take 4 mg by mouth 2 times daily             losartan (COZAAR) 25 MG tablet  Take 25 mg by mouth daily             rosuvastatin (CRESTOR) 20 MG tablet  Take 20 mg by mouth daily                 Discharge Instructions:   Establish with PCP. Take medications as directed. Resume activity as tolerated. Disposition: Patient is medically stable and will be discharged to SNF. Time spent on discharge over 30 minutes.         Signed:  Gabbie Salazar MD  10/14/2020 8:19 AM

## 2023-08-16 NOTE — CONSULTS
Comprehensive Nutrition Assessment    Type and Reason for Visit:  Initial, Consult    Nutrition Recommendations/Plan: Start ONS BID. Nutrition Assessment:  Consult received for poor intake/appetite x5 days. No intake documented at this time. Pt is on Clear Liquid diet at this time, will add Clear ONS BID to supplement PO intake as pt has likely had poor nutrient intake over the last several days. Will monitor diet advancement and PO intake. Will implement other intervention as needed. Malnutrition Assessment:  Malnutrition Status:  No malnutrition    Context:  Acute Illness     Findings of the 6 clinical characteristics of malnutrition:  Energy Intake:  Mild decrease in energy intake (Comment)  Weight Loss:  Unable to assess     Body Fat Loss:  No significant body fat loss     Muscle Mass Loss:  No significant muscle mass loss    Fluid Accumulation:  No significant fluid accumulation     Strength:  Not Performed    Estimated Daily Nutrient Needs:  Energy (kcal):  7147-0191; Weight Used for Energy Requirements:  Current     Protein (g):  ; Weight Used for Protein Requirements:  Ideal(1.3-2.0)        Fluid (ml/day):  3906-9438; Weight Used for Fluid Requirements:  Current      Wounds:  None       Current Nutrition Therapies:    DIET CLEAR LIQUID; Anthropometric Measures:  · Height: 5' 7\" (170.2 cm)  · Current Body Weight: 181 lb 4 oz (82.2 kg)   · Ideal Body Weight: 135 lbs; % Ideal Body Weight 134.3 %   · BMI: 28.4  · BMI Categories: Overweight (BMI 25.0-29. 9)       Nutrition Diagnosis:   · Inadequate oral intake related to cognitive or neurological impairment as evidenced by poor intake prior to admission      Nutrition Interventions:   Food and/or Nutrient Delivery:  Continue Current Diet, Start Oral Nutrition Supplement  Nutrition Education/Counseling:  No recommendation at this time   Coordination of Nutrition Care:  Continued Inpatient Monitoring    Goals:  PO intake >50%, wt stable Nutrition Monitoring and Evaluation:    Food/Nutrient Intake Outcomes:  Food and Nutrient Intake, Supplement Intake  Physical Signs/Symptoms Outcomes:  Biochemical Data, Skin, Weight, Nutrition Focused Physical Findings     Discharge Planning:     Too soon to determine     Electronically signed by Jarred Roque RD, KARTHIK on 10/5/20 at 10:34 AM CDT    Contact: [phone] disease)  Comments:  ASymptomatic, waiting for stenting